# Patient Record
Sex: FEMALE | Race: WHITE | NOT HISPANIC OR LATINO | Employment: OTHER | URBAN - METROPOLITAN AREA
[De-identification: names, ages, dates, MRNs, and addresses within clinical notes are randomized per-mention and may not be internally consistent; named-entity substitution may affect disease eponyms.]

---

## 2017-09-24 ENCOUNTER — HISTORICAL (OUTPATIENT)
Dept: ADMINISTRATIVE | Facility: HOSPITAL | Age: 62
End: 2017-09-24

## 2018-03-06 ENCOUNTER — HISTORICAL (OUTPATIENT)
Dept: INTERNAL MEDICINE | Facility: CLINIC | Age: 63
End: 2018-03-06

## 2018-03-06 LAB
ABS NEUT (OLG): 2.84 X10(3)/MCL (ref 2.1–9.2)
ALBUMIN SERPL-MCNC: 3.4 GM/DL (ref 3.4–5)
ALBUMIN/GLOB SERPL: 1 RATIO (ref 1–2)
ALP SERPL-CCNC: 73 UNIT/L (ref 45–117)
ALT SERPL-CCNC: 20 UNIT/L (ref 12–78)
AST SERPL-CCNC: 15 UNIT/L (ref 15–37)
BASOPHILS # BLD AUTO: 0.04 X10(3)/MCL
BASOPHILS NFR BLD AUTO: 1 %
BILIRUB SERPL-MCNC: 0.4 MG/DL (ref 0.2–1)
BILIRUBIN DIRECT+TOT PNL SERPL-MCNC: 0.1 MG/DL
BILIRUBIN DIRECT+TOT PNL SERPL-MCNC: 0.3 MG/DL
BUN SERPL-MCNC: 14 MG/DL (ref 7–18)
CALCIUM SERPL-MCNC: 9.7 MG/DL (ref 8.5–10.1)
CHLORIDE SERPL-SCNC: 107 MMOL/L (ref 98–107)
CHOLEST SERPL-MCNC: 183 MG/DL
CHOLEST/HDLC SERPL: 3.5 {RATIO} (ref 0–4.4)
CO2 SERPL-SCNC: 29 MMOL/L (ref 21–32)
CREAT SERPL-MCNC: 0.7 MG/DL (ref 0.6–1.3)
EOSINOPHIL # BLD AUTO: 0.34 X10(3)/MCL
EOSINOPHIL NFR BLD AUTO: 6 %
ERYTHROCYTE [DISTWIDTH] IN BLOOD BY AUTOMATED COUNT: 12.5 % (ref 11.5–14.5)
EST. AVERAGE GLUCOSE BLD GHB EST-MCNC: 108 MG/DL
FERRITIN SERPL-MCNC: 246.3 NG/ML (ref 8–388)
GLOBULIN SER-MCNC: 4.2 GM/ML (ref 2.3–3.5)
GLUCOSE SERPL-MCNC: 89 MG/DL (ref 74–106)
HBA1C MFR BLD: 5.4 % (ref 4.2–6.3)
HCT VFR BLD AUTO: 38.4 % (ref 35–46)
HDLC SERPL-MCNC: 52 MG/DL
HGB BLD-MCNC: 13.1 GM/DL (ref 12–16)
IMM GRANULOCYTES # BLD AUTO: 0.02 10*3/UL
IMM GRANULOCYTES NFR BLD AUTO: 0 %
IRON SATN MFR SERPL: 36.3 % (ref 15–50)
IRON SERPL-MCNC: 95 MCG/DL (ref 50–170)
LDLC SERPL CALC-MCNC: 110 MG/DL (ref 0–130)
LYMPHOCYTES # BLD AUTO: 1.78 X10(3)/MCL
LYMPHOCYTES NFR BLD AUTO: 34 % (ref 13–40)
MAGNESIUM SERPL-MCNC: 2.1 MG/DL (ref 1.8–2.4)
MCH RBC QN AUTO: 31.8 PG (ref 26–34)
MCHC RBC AUTO-ENTMCNC: 34.1 GM/DL (ref 31–37)
MCV RBC AUTO: 93.2 FL (ref 80–100)
MONOCYTES # BLD AUTO: 0.3 X10(3)/MCL
MONOCYTES NFR BLD AUTO: 6 % (ref 4–12)
NEUTROPHILS # BLD AUTO: 2.84 X10(3)/MCL
NEUTROPHILS NFR BLD AUTO: 53 X10(3)/MCL
PLATELET # BLD AUTO: 234 X10(3)/MCL (ref 130–400)
PMV BLD AUTO: 9.5 FL (ref 7.4–10.4)
POTASSIUM SERPL-SCNC: 4.6 MMOL/L (ref 3.5–5.1)
PROT SERPL-MCNC: 7.6 GM/DL (ref 6.4–8.2)
RBC # BLD AUTO: 4.12 X10(6)/MCL (ref 4–5.2)
SODIUM SERPL-SCNC: 144 MMOL/L (ref 136–145)
TIBC SERPL-MCNC: 262 MCG/DL (ref 250–450)
TRANSFERRIN SERPL-MCNC: 217 MG/DL (ref 200–360)
TRIGL SERPL-MCNC: 107 MG/DL
VLDLC SERPL CALC-MCNC: 21 MG/DL
WBC # SPEC AUTO: 5.3 X10(3)/MCL (ref 4.5–11)

## 2018-04-02 ENCOUNTER — HISTORICAL (OUTPATIENT)
Dept: ADMINISTRATIVE | Facility: HOSPITAL | Age: 63
End: 2018-04-02

## 2018-04-11 ENCOUNTER — HISTORICAL (OUTPATIENT)
Dept: RADIOLOGY | Facility: HOSPITAL | Age: 63
End: 2018-04-11

## 2018-05-21 ENCOUNTER — HISTORICAL (OUTPATIENT)
Dept: RADIOLOGY | Facility: HOSPITAL | Age: 63
End: 2018-05-21

## 2018-06-29 ENCOUNTER — HISTORICAL (OUTPATIENT)
Dept: INTERNAL MEDICINE | Facility: CLINIC | Age: 63
End: 2018-06-29

## 2018-12-04 ENCOUNTER — HISTORICAL (OUTPATIENT)
Dept: INTERNAL MEDICINE | Facility: CLINIC | Age: 63
End: 2018-12-04

## 2019-01-08 ENCOUNTER — HISTORICAL (OUTPATIENT)
Dept: ADMINISTRATIVE | Facility: HOSPITAL | Age: 64
End: 2019-01-08

## 2019-01-23 ENCOUNTER — HISTORICAL (OUTPATIENT)
Dept: INTERNAL MEDICINE | Facility: CLINIC | Age: 64
End: 2019-01-23

## 2019-02-27 ENCOUNTER — HISTORICAL (OUTPATIENT)
Dept: ADMINISTRATIVE | Facility: HOSPITAL | Age: 64
End: 2019-02-27

## 2019-03-01 LAB — FINAL CULTURE: NORMAL

## 2019-05-01 ENCOUNTER — HISTORICAL (OUTPATIENT)
Dept: INTERNAL MEDICINE | Facility: CLINIC | Age: 64
End: 2019-05-01

## 2019-06-19 ENCOUNTER — HISTORICAL (OUTPATIENT)
Dept: RADIOLOGY | Facility: HOSPITAL | Age: 64
End: 2019-06-19

## 2019-09-05 ENCOUNTER — HISTORICAL (OUTPATIENT)
Dept: INTERNAL MEDICINE | Facility: CLINIC | Age: 64
End: 2019-09-05

## 2019-10-04 ENCOUNTER — HISTORICAL (OUTPATIENT)
Dept: INTERNAL MEDICINE | Facility: CLINIC | Age: 64
End: 2019-10-04

## 2020-10-05 ENCOUNTER — HISTORICAL (OUTPATIENT)
Dept: ADMINISTRATIVE | Facility: HOSPITAL | Age: 65
End: 2020-10-05

## 2021-01-04 ENCOUNTER — HISTORICAL (OUTPATIENT)
Dept: ADMINISTRATIVE | Facility: HOSPITAL | Age: 66
End: 2021-01-04

## 2021-07-13 ENCOUNTER — HISTORICAL (OUTPATIENT)
Dept: ADMINISTRATIVE | Facility: HOSPITAL | Age: 66
End: 2021-07-13

## 2021-07-16 LAB — FINAL CULTURE: NORMAL

## 2021-07-21 ENCOUNTER — HISTORICAL (OUTPATIENT)
Dept: RADIOLOGY | Facility: HOSPITAL | Age: 66
End: 2021-07-21

## 2021-10-26 ENCOUNTER — HISTORICAL (OUTPATIENT)
Dept: ADMINISTRATIVE | Facility: HOSPITAL | Age: 66
End: 2021-10-26

## 2021-11-08 ENCOUNTER — HISTORICAL (OUTPATIENT)
Dept: ADMINISTRATIVE | Facility: HOSPITAL | Age: 66
End: 2021-11-08

## 2021-11-09 ENCOUNTER — HISTORICAL (OUTPATIENT)
Dept: SURGERY | Facility: HOSPITAL | Age: 66
End: 2021-11-09

## 2021-12-07 LAB
HUMAN PAPILLOMAVIRUS (HPV): NORMAL
PAP RECOMMENDATION EXT: NORMAL
PAP SMEAR: NORMAL

## 2021-12-14 ENCOUNTER — HISTORICAL (OUTPATIENT)
Dept: RADIOLOGY | Facility: HOSPITAL | Age: 66
End: 2021-12-14

## 2021-12-30 ENCOUNTER — HISTORICAL (OUTPATIENT)
Dept: ADMINISTRATIVE | Facility: HOSPITAL | Age: 66
End: 2021-12-30

## 2022-04-07 ENCOUNTER — HISTORICAL (OUTPATIENT)
Dept: ADMINISTRATIVE | Facility: HOSPITAL | Age: 67
End: 2022-04-07
Payer: MEDICARE

## 2022-04-24 VITALS
HEIGHT: 64 IN | WEIGHT: 231.5 LBS | SYSTOLIC BLOOD PRESSURE: 147 MMHG | OXYGEN SATURATION: 98 % | DIASTOLIC BLOOD PRESSURE: 87 MMHG | BODY MASS INDEX: 39.52 KG/M2

## 2022-04-30 NOTE — OP NOTE
Patient:   Meseret Tello             MRN: [id number]            FIN: [id number]               Age:   66 years     Sex:  Female     :  1955   Associated Diagnoses:   None   Author:   Young Haro MD      OPERATIVE NOTE -- OPHTHALMOLOGY SERVICE     DATE: 21     SURGEON: Young Haro MD     ATTENDING: ENRICO Ramirez MD     PRE-OPERATIVE DIAGNOSIS: Visually significant cataract, RIGHT eye     POST-OPERATIVE DIAGNOSIS: Visually significant cataract, RIGHT eye     PROCEDURES: Phacoemulsification with intraocular lens, RIGHT eye     IMPLANT: [id number] +23.50 Diopters     ANESTHESIA: MAC with topical lidocaine     COMPLICATIONS: None     ESTIMATED BLOOD LOSS: < 5 cc     INDICATION:     The patient has a history of painless progressive visual loss and difficulty with activities of daily living secondary to cataract formation. After a thorough discussion of the risks, benefits and alternatives to cataract surgery, including, but not limited to, the rare risks of infection, retinal detachment, need for additional surgery, loss of vision and even loss of the eye, the patient voices good understanding and desires to proceed. Written informed consent was confirmed and witnessed prior to surgery.     PROCEDURE IN DETAIL:     The patients IOL calculations and lens selection were reviewed and confirmed. After verification and marking of the proper eye in the preop holding area, several sets of 1% tropicamide, 2.5% phenylephrine, and 1% cyclopentolate drops were then placed.     The patient was brought to the operating room in supine position where general anesthesia was achieved and the eye was prepped and draped in standard sterile fashion using 5% betadine. A lid speculum placed. A paracentesis incision was created through which viscoelastic was used to fill the anterior chamber. A 2.5mm keratome blade was used to create a triplanar temporal clear corneal incision. A cystotome and Utrata forceps was then used to  fashion a continuous curvilinear capsulorrhexis. Hydrodissection with BSS was performed using a hydrodissection cannula. Phacoemulsification of the nucleus was carried out with a divide and conquer technique. Residual cortical material was then removed with the irrigation and aspiration handpiece. The eye was reformed using viscoelastic and the above listed intraocular lens was implanted into the capsular bag. All remaining viscoelastic was removed from the eye. At the end of the case, the lens was well-centered and all wounds were found to be watertight.     Drops of Vigamox, Flurbiprofren, and Pred Forte were instilled and an eye shield placed over the eye. The patient tolerated the procedure well and was taken to the recovery area in stable condition. The patient was instructed to follow-up for routine post-operative care the following morning.     The attending surgeon was present, supervising, and assisting as necessary during the entire surgery.

## 2022-04-30 NOTE — H&P
Patient:   Meseret Tello             MRN: [id number]            FIN: [id number]               Age:   66 years     Sex:  Female     :  1955   Associated Diagnoses:   None   Author:   Young Haro MD      HPI: PT here for CE/IOL RIGHT eye. Denies changes in health or medications since the patient was last seen in clinic.     ROS: Negative x 10     SLE:  Ext: wnl OU  L/L: wnl OU  C/S: white and quiet OU  K: clear OU  AC: deep and quiet OU  I: round and reactive OU  L: cataract RIGHT eye     General NAP  HENT atraumatic  Eyes: cataract  Neck: nontender, no masses or thyromegaly  Respiratory: no distress on room air  Cardiovascular: regular rate  Gastrointestinal: no hepatomegaly  Lymphatics: no lymphadenopathy  Musculoskeletal: wnl     Assessment/Plan  1. Visually significant cataract RIGHT eye  - Pt complains of decreased vision  - Calcs obtained previously, review calcs before selecting lens  - After extensive discussion of R/B/A, informed consent was signed in clinic and reviewed today  - Plan for CE/IOL RIGHT eye today

## 2022-05-01 NOTE — HISTORICAL [facility] CERNER
This is a historical note converted from Zoraida. Formatting and pictures may have been removed.  Please reference Zoraida for original formatting and attached multimedia. Chief Complaint  FOLLOW UP APPOINTMENT  History of Present Illness  Ms. Tello is a 65 yof with PMHx significant for HTN, GERD, basal cell carcinoma (s/p excision 6/15) who presents to [facility] IM clinic for routine followup.? Patient last seen in clinic on 1/25/21 via telemedicine.  Today patient reports acute complaints including worsening bilateral foot and ankle pain over the last 2 months.? She states that she can stand for ~1 hour before experiencing significant foot and ankle pain requiring her to sit for ~20-30 minutes for relief.? 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  She also reports intermittent itching and UTI like symptoms, of note, was seen in [facility] ED in May and UA significant for UTI at that time, was prescribed course of ABX which she reports completing.  ?  Previous attempt to refer to [facility] GYN clinic for annual wellness exam, but patient declined out of concern for potential COVID exposure.? She is amicable?to clinic referral now though, therefore will place new referral.? Declining vaccines today, but agreeable to DEXA scan.  Review of Systems  Constitutional:?no fever, fatigue, weakness, no weight loss or gain  Respiratory:?no cough, no wheezing, no shortness of breath  Cardiovascular:?no chest pain, no palpitations, no edema  Gastrointestinal:?no nausea, vomiting, or diarrhea. No abdominal pain  Genitourinary:?no dysuria, reports itching intermittently  Musculoskeletal:?foot and ankle pain  Integumentary:?no skin rash or abnormal lesion  Neurologic: no headache, no dizziness, no weakness or numbness  ?  Physical Exam  Vitals & Measurements  T:?36.6? ?C (Oral)? HR:?92(Peripheral)? RR:?18? BP:?138/80?  HT:?158.00?cm? WT:?111.000?kg? BMI:?44.46?  General:?well-developed well-nourished in no acute  distress  Respiratory:?clear to auscultation bilaterally, no wheezing, no respiratory distress  Cardiovascular:?regular rate and rhythm without murmurs, gallops or rubs, no peripheral edema  Gastrointestinal:?soft, non-tender,?obese with normal bowel sounds, without masses to palpation  Musculoskeletal:?notable pes planus bilateral feet  Integumentary: no rashes or skin lesions present  Neurologic: no signs of peripheral neurological deficit, motor/sensory function intact, answers questions appropriately  ?  Assessment/Plan  Ankle Joint Pain  Pes Planus  -lengthy discussion regarding arch support, including inserts and/or orthotics, patient would like to try these prior to referral to podiatry, etc  -XR right, left ankles today  ?   UTI  Urinary complaints  -noted UTI at ED presentation in May, reports completing ABX  -reports intermittent itching, possible dsyuria, will repeat UA today  ?   GERD  -esomeprazole? (patient buys OTC), patient reports taking PRN  -lengthy discussion today regarding PRN use vs daily use, has had previous EGD.? If symptoms worsen or change, discussed possible repeat EGD in future  -notices food triggers, trying to avoid those triggers, including fried foods and sweets, red/tomato sauce  ?   Hypertension  -patient not on antihypertensive medication at this time  -B/P today?138/80  ?   Restless Leg Syndrome  -Patient reports infrequent?episodes of restless leg  -Patient has prescription for ropinirole 0.25 mg, states she still has prescriptions left as she does not have to take this very often  ?   History of right temporal basal cell carcinoma  -(status post excision 6/15)  -Derm referral at previous visit  ?   Allergic rhinitis  -patient takes Claritin daily  ?   Health Maintenance  -OB/GYN referral placed at previous visit for cervical cancer screening, GYN called but patient declined visit out of concern for COVID-19/new [place] strain.? Will place another referral today for annual  screening  -DEXA scan ordered today  -Flu shot at?previous visit  -Refusing vaccines today  -reviewed recent labs from ED visit in 5/21, WNL  ?  ?  ?  UA, XR ankles bilateral today  Referral to GYN  DEXA scan ordered  Return to clinic in 6 months   Problem List/Past Medical History  Ongoing  Allergic rhinitis  Breast cancer screening  Combined form of age-related cataract, both eyes  GERD (gastroesophageal reflux disease)  H/O gastroesophageal reflux (GERD)  Hypertension  Hypertension  Leg cramp  Obesity  Skin lesion  Historical  No qualifying data  Procedure/Surgical History  excision of BCC right temple (06/24/2015)  Angiocardiography of left heart structures (12/29/2014)  Catheter placement in coronary artery(s) for coronary angiography, including intraprocedural injection(s) for coronary angiography, imaging supervision and interpretation; with left heart catheterization including intraprocedural i. (12/29/2014)  Left heart cardiac catheterization (12/29/2014)  Left Heart Cath w/COR Angio Ventriculography (None) (12/29/2014)  Appendectomy  Biopsy of breast  Breast lumpectomy  cyst removed from right breast  Excision of lesion of artery of upper limb  skin cancer removed from left arm   Medications  albuterol 2.5 mg/3 mL (0.083%) inhalation solution, 2.5 mg= 3 mL, NEB, q6hr, PRN, 2 refills  aspirin 81 mg oral tablet, 81 mg= 1 tab(s), Oral, Daily, 3 refills  biotin 1000 mcg oral tablet, 1000 mcg= 1 tab(s), Oral, Daily, 2 refills  Claritin 10 mg oral tablet, 10 mg= 1 tab(s), Oral, Daily, 2 refills  docusate sodium 100 mg oral tablet, 1, Oral, Daily, 3 refills  nitroglycerin 0.4 mg sublingual TAB, 0.4 mg= 1 tab(s), SL, q5min, PRN, 2 refills  ropinirole 0.25 mg oral tablet, 0.25 mg= 1 tab(s), Oral, Daily, PRN, 3 refills,? ?Still taking, not as prescribed: prn  Triple Antibiotic Ointment topical, 1 neida, TOP, Once  Allergies  No Known Medication Allergies  Social History  Abuse/Neglect  No, No, Yes, 07/13/2021  Alcohol  - Low Risk, 09/10/2014  Current, 1-2 times per year, 01/04/2021  Employment/School - Not employed or in school, 09/10/2014  Unemployed, 09/10/2019  Exercise  Exercise frequency: 3-4 times/week., 09/10/2019  Home/Environment - No Risk, 09/10/2014  Lives with Spouse. Living situation: Home/Independent., 09/10/2019  Nutrition/Health  Regular, 09/10/2019  Sexual  Gender Identity Identifies as female., 03/04/2021  Substance Use - Denies Substance Abuse, 09/10/2014  Never, 09/10/2019  Tobacco - Denies Tobacco Use, 09/10/2014  Former smoker, quit more than 30 days ago, N/A, 07/13/2021  Family History  Cancer: Father.  Cardiac arrhythmia.: Mother.  Hyperlipidemia.: Brother.  Hypertension.: Brother.  Immunizations  Vaccine Date Status   influenza virus vaccine, inactivated 10/05/2020 Given   influenza virus vaccine, inactivated 01/02/2020 Given   influenza virus vaccine, inactivated 09/19/2018 Given   tetanus/diphth/pertuss (Tdap) adult/adol 06/30/2016 Recorded   Health Maintenance  Health Maintenance  ???Pending?(in the next year)  ??? ??Due?  ??? ? ? ?Breast Cancer Screening due??06/18/21??and every 2??year(s)  ??? ? ? ?Bone Density Screening due??07/13/21??Variable frequency  ??? ? ? ?IPPE due??07/13/21??One-time only  ??? ? ? ?Medicare Annual Wellness Exam due??07/13/21??and every 1??year(s)  ??? ? ? ?Pneumococcal Vaccine due??07/13/21??Unknown Frequency  ??? ? ? ?Zoster Vaccine due??07/13/21??Unknown Frequency  ??? ??Due In Future?  ??? ? ? ?Influenza Vaccine not due until??10/01/21??and every 1??day(s)  ??? ? ? ?Aspirin Therapy for CVD Prevention not due until??10/05/21??and every 1??year(s)  ??? ? ? ?Colorectal Screening not due until??12/04/21??and every 1??year(s)  ??? ? ? ?Obesity Screening not due until??01/01/22??and every 1??year(s)  ??? ? ? ?Advance Directive not due until??01/02/22??and every 1??year(s)  ??? ? ? ?Alcohol Misuse Screening not due until??01/02/22??and every 1??year(s)  ??? ? ? ?Cognitive  Screening not due until??01/02/22??and every 1??year(s)  ??? ? ? ?Fall Risk Assessment not due until??01/02/22??and every 1??year(s)  ??? ? ? ?Functional Assessment not due until??01/02/22??and every 1??year(s)  ??? ? ? ?ADL Screening not due until??01/25/22??and every 1??year(s)  ??? ? ? ?Hypertension Management-BMP not due until??05/22/22??and every 1??year(s)  ???Satisfied?(in the past 1 year)  ??? ??Satisfied?  ??? ? ? ?ADL Screening on??01/25/21.??Satisfied by Gabriella Lott LPN.  ??? ? ? ?Advance Directive on??07/13/21.??Satisfied by Gabriella Lott LPN.  ??? ? ? ?Alcohol Misuse Screening on??01/04/21.??Satisfied by Selin Webster RN  ??? ? ? ?Aspirin Therapy for CVD Prevention on??10/05/20.??Satisfied by Edmond Wild DO??Reason: Expectation Satisfied Elsewhere  ??? ? ? ?Blood Pressure Screening on??07/13/21.??Satisfied by Gabriella Lott LPN.  ??? ? ? ?Body Mass Index Check on??07/13/21.??Satisfied by Gabriella Lott LPN.  ??? ? ? ?Cognitive Screening on??07/13/21.??Satisfied by Gabriella Lott LPN.  ??? ? ? ?Colorectal Screening on??12/03/20.??Satisfied by Neris Glynn  ??? ? ? ?Depression Screening on??07/13/21.??Satisfied by Gabriella Lott LPN.  ??? ? ? ?Diabetes Maintenance-Eye Exam on??03/04/21.??Satisfied by Helen Sharma  ??? ? ? ?Diabetes Screening on??05/21/21.??Satisfied by Talita Schmidt  ??? ? ? ?Fall Risk Assessment on??07/13/21.??Satisfied by Gabriella Lott LPN  ??? ? ? ?Functional Assessment on??07/13/21.??Satisfied by Gabriella Lott LPN  ??? ? ? ?Hypertension Management-Blood Pressure on??07/13/21.??Satisfied by Gabriella Lott LPN.  ??? ? ? ?Influenza Vaccine on??10/05/20.??Satisfied by Sissy Pan LPN  ??? ? ? ?Lipid Screening on??10/05/20.??Satisfied by Talita Schmidt  ??? ? ? ?Obesity Screening on??07/13/21.??Satisfied by Gabriella Lott LPN  ?      Reviewed present illness, physical exam, assessment/plan of resident. Case discussed with the  resident. Care provided is reasonable and necessary.

## 2022-05-01 NOTE — HISTORICAL [facility] CERNER
This is a historical note converted from Zoraida. Formatting and pictures may have been removed.  Please reference Zoraida for original formatting and attached multimedia. Chief Complaint  right flamk pain x 2 days  History of Present Illness  63 year old WF with complaints of right flank pain x 2 days. No dysuria or hematuria reported. No N/V. No fever. Chronic urinary frequency; no incontinence. No vaginal irritation or discharge.  She has taken tylenol with relief of symptoms.  Hx of Gastritis, CAD, high cholesterol.  PCP Dr. Powers.  Review of Systems  GENERAL: Negative except as stated?in HPI  CV: Negative except as stated in HPI  RESP: Negative except as stated?in HPI  GI: Negative?except as stated in?HPI  : Negative?except as stated in?HPI  SKIN: Negative?except as stated in?HPI  Neuro: Negative?except as stated in?HPI  MS: Negative?except as stated in?HPI  Psych: Negative?except as stated in?HPI  Physical Exam  Vitals & Measurements  T:?36.8? ?C (Oral)? HR:?70(Peripheral)? RR:?18? BP:?114/77? SpO2:?96%?  HT:?158?cm? WT:?106.4?kg? BMI:?42.41?  Vitals reviewed  GENERAL: Awake and alert; no acute distress.  CV: Normal rate and rhythm. No murmur or JVD. No edema. Normal peripheral perfusion or pulses.  RESP: Respirations even and nonlabored. BBS clear to auscultation.  GI: Abdomen obese,?soft and non distended. Normoactive bowel sounds x 4 quadrants. No significant tenderness with palpation; no rebound or guarding.?No CVA tenderness.  NEURO: Awake, alert and oriented. No focal or sensory deficits.  INTEGUMENTARY: Skin warm, dry, and intact. No rashes or?unusual bruising.  PSYCH: Appropriate mood and affect; cooperative.  ?  Assessment/Plan  1.?Acute UTI?N39.0  Urine Dipstick with moderate leukocytes, trace blood; urine C&S pending. Rx for Macrobid and Pyridium. Increase oral fluids. Always wipe front to back. Follow up for?persistent urinary symptoms. TO ER for fever, chills, N/V, tisha hematuria or worsening  in condition.  Ordered:  After Hrs Visit [id number] PC, Acute UTI, 02/27/19 17:36:00 CST  Office/Outpatient Visit Level 3 Established [id number] PC, Acute UTI, 02/27/19 17:36:00 CST  Urine Culture [id number], Stat collect, 02/27/19 17:38:00 CST, Order for future visit, Urine, Nurse collect, Stop date 02/27/19 17:38:00 CST, Acute UTI  ?  Lower back pain?M54.5  ?  Orders:  nitrofurantoin, 100 mg = 1 cap(s), Oral, BID, with food, X 7 day(s), # 14 cap(s), 0 Refill(s), Pharm[facility]: [id number]  phenazopyridine, 100 mg = 1 tab(s), Oral, TID, X 3 day(s), # 9 tab(s), 0 Refill(s), Pharmacy: [facility]   Problem List/Past Medical History  Ongoing  Allergic rhinitis  Breast cancer screening  GERD (gastroesophageal reflux disease)  H/O gastroesophageal reflux (GERD)  Hypertension  Hypertension  Leg cramp  Obesity  Skin lesion  Historical  No qualifying data  Procedure/Surgical History  excision of BCC right temple (06/24/2015)  Left Heart Cath w/COR Angio Ventriculography (None) (12/29/2014)  Appendectomy  Appendectomy  Biopsy of breast  Breast lumpectomy  cyst removed from right breast  Excision of lesion of artery of upper limb  skin cancer removed from left arm   Medications  albuterol 2.5 mg/3 mL (0.083%) inhalation solution, 2.5 mg= 3 mL, NEB, q6hr, PRN, 2 refills  aspirin 81 mg oral tablet, 81 mg= 1 tab(s), Oral, Daily, 3 refills  Benicar 20 mg oral tablet, 20 mg= 1 tab(s), Oral, Daily, 9 refills  biotin 1000 mcg oral tablet, 1000 mcg= 1 tab(s), Oral, Daily, 2 refills  Carafate 1 g oral tablet, 1 gm= 1 tab(s), Oral, QID  Claritin 10 mg oral tablet, 10 mg= 1 tab(s), Oral, Daily, 2 refills  docusate sodium 100 mg oral tablet, 1, Oral, Daily, 3 refills  Macrobid 100 mg oral capsule, 100 mg= 1 cap(s), Oral, BID  nitroglycerin 0.4 mg sublingual TAB, 0.4 mg= 1 tab(s), SL, q5min, PRN, 1 refills  Pyridium 100 mg oral tablet, 100 mg= 1 tab(s), Oral, TID  Triple Antibiotic Ointment topical, 1 neida, TOP, Once  Allergies  No Known  Medication Allergies  Social History  Alcohol - Low Risk, 09/10/2014  Current, Beer, 1-2 times per year, Alcohol use interferes with work or home: No. Drinks more than intended: No. Others hurt by drinking: No. Ready to change: No. Household alcohol concerns: No., 02/27/2019  Employment/School - Not employed or in school, 09/10/2014  Unemployed, 07/21/2016  Exercise  Exercise frequency: 3-4 times/week. Self assessment: Fair condition. Exercise type: [employer] exercise for 1 hour., 01/08/2019  Home/Environment - No Risk, 09/10/2014  Lives with Spouse. Living situation: Home/Independent. Alcohol abuse in household: No. Substance abuse in household: No. Smoker in household: No. Injuries/Abuse/Neglect in household: No. Feels unsafe at home: No., 09/10/2014  Nutrition/Health  Regular, Sleeping concerns: No. Feels highly stressed: No., 03/26/2015  Substance Abuse - Denies Substance Abuse, 09/10/2014  Never, 02/27/2019  Tobacco - Denies Tobacco Use, 09/10/2014  Former smoker, quit more than 30 days ago, N/A, Ready to change: No. Previous treatment: None. Household tobacco concerns: No. Smokeless Tobacco Use: Never., 02/27/2019  Former smoker, quit more than 30 days ago, No, 02/18/2019  Family History  Cancer: Father.  Cardiac arrhythmia.: Mother.  Hyperlipidemia.: Brother.  Hypertension.: Brother.  Immunizations  Vaccine Date Status   influenza virus vaccine, inactivated 09/19/2018 Given   tetanus/diphth/pertuss (Tdap) adult/adol 06/30/2016 Recorded   Health Maintenance  Health Maintenance  ???Pending?(in the next year)  ??? ??Due?  ??? ? ? ?Alcohol Misuse Screening due??02/27/19??and every 1??year(s)  ??? ? ? ?Cervical Cancer Screening due??02/27/19??and every?  ??? ? ? ?Zoster Vaccine due??02/27/19??and every 100??year(s)  ??? ??Due In Future?  ??? ? ? ?Colorectal Screening not due until??12/04/19??and every 1??year(s)  ??? ? ? ?ADL Screening not due until??01/08/20??and every 1??year(s)  ??? ? ? ?Aspirin Therapy  for CVD Prevention not due until??01/08/20??and every 1??year(s)  ??? ? ? ?Hypertension Management-BMP not due until??01/23/20??and every 1??year(s)  ???Satisfied?(in the past 1 year)  ??? ??Satisfied?  ??? ? ? ?ADL Screening on??01/08/19.??Satisfied by Domo Obando RN  ??? ? ? ?Aspirin Therapy for CVD Prevention on??01/08/19.??Satisfied by Asa Powers DO  ??? ? ? ?Blood Pressure Screening on??02/27/19.??Satisfied by Lisa Anderson LPN  ??? ? ? ?Body Mass Index Check on??02/27/19.??Satisfied by Lisa Anderson LPN  ??? ? ? ?Breast Cancer Screening on??04/11/18.??Satisfied by Chelsy Samayoa  ??? ? ? ?Colorectal Screening on??12/03/18.??Satisfied by Mary Krishnan  ??? ? ? ?Depression Screening on??02/27/19.??Satisfied by Lisa Anderson LPN  ??? ? ? ?Diabetes Maintenance-Eye Exam on??11/12/18.??Satisfied by Shani Woody  ??? ? ? ?Diabetes Screening on??01/23/19.??Satisfied by Stephanie Steward.  ??? ? ? ?Hypertension Management-Blood Pressure on??02/27/19.??Satisfied by Lisa Anderson LPN  ??? ? ? ?Influenza Vaccine on??02/18/19.??Satisfied by Mallika Anderson MA  ??? ? ? ?Lipid Screening on??01/23/19.??Satisfied by Stephanie Steward P.  ??? ? ? ?Obesity Screening on??02/27/19.??Satisfied by Lisa Anderson LPN  ?  ?  Lab Results  Test Name Test Result Date/Time   Urine Color Urine Dipstick Yellow 02/27/2019 17:18 CST   Urine Appearance Urine Dipstick Clear 02/27/2019 17:18 CST   pH Urine Dipstick 6 02/27/2019 17:18 CST   Specific Gravity Urine Dipstick 1.005 02/27/2019 17:18 CST   Blood Urine Dipstick Trace 02/27/2019 17:18 CST   Glucose Urine Dipstick Negative 02/27/2019 17:18 CST   Ketones Urine Dipstick Negative 02/27/2019 17:18 CST   Protein Urine Dipstick Negative 02/27/2019 17:18 CST   Bilirubin Urine Dipstick Negative 02/27/2019 17:18 CST   Urobilinogen Urine Dipstick 1 mg/dl 02/27/2019 17:18 CST   Leukocytes Urine Dipstick Negative 02/27/2019 17:18 CST   Nitrite Urine Dipstick Negative  02/27/2019 17:18 CST

## 2022-05-24 ENCOUNTER — OFFICE VISIT (OUTPATIENT)
Dept: FAMILY MEDICINE | Facility: CLINIC | Age: 67
End: 2022-05-24
Payer: MEDICARE

## 2022-05-24 VITALS
HEIGHT: 64 IN | RESPIRATION RATE: 18 BRPM | OXYGEN SATURATION: 97 % | SYSTOLIC BLOOD PRESSURE: 112 MMHG | HEART RATE: 73 BPM | WEIGHT: 231 LBS | TEMPERATURE: 98 F | DIASTOLIC BLOOD PRESSURE: 73 MMHG | BODY MASS INDEX: 39.44 KG/M2

## 2022-05-24 DIAGNOSIS — R22.30 NODULE OF SKIN OF HAND: Primary | ICD-10-CM

## 2022-05-24 PROCEDURE — 88305 TISSUE EXAM BY PATHOLOGIST: CPT | Performed by: STUDENT IN AN ORGANIZED HEALTH CARE EDUCATION/TRAINING PROGRAM

## 2022-05-24 PROCEDURE — 11104 PUNCH BX SKIN SINGLE LESION: CPT | Mod: PBBFAC | Performed by: STUDENT IN AN ORGANIZED HEALTH CARE EDUCATION/TRAINING PROGRAM

## 2022-05-24 PROCEDURE — 99214 OFFICE O/P EST MOD 30 MIN: CPT | Mod: PBBFAC,25

## 2022-05-24 RX ORDER — LIDOCAINE HCL/EPINEPHRINE/PF 2%-1:200K
5 VIAL (ML) INJECTION ONCE
Status: COMPLETED | OUTPATIENT
Start: 2022-05-24 | End: 2022-05-24

## 2022-05-24 RX ADMIN — Medication 5 ML: at 12:05

## 2022-05-24 NOTE — PROGRESS NOTES
Chief Complaint  Skin lesion on right wrist (Tender to touch)      History of Present Illness  Meseret Tello is a 67 y.o. year old female presents for skin lesion on the R wrist for 2 months. Pt states the area was previously frozen, but the area did not get any smaller. No other concerns    Review of Systems  Review of Systems   Constitutional: Negative for fever.   Skin:        See HPI       Physical Exam  Physical Exam  Skin:               Vitals:    05/24/22 1017   BP: 112/73   Pulse: 73   Resp: 18   Temp: 98 °F (36.7 °C)     Wt Readings from Last 2 Encounters:   05/24/22 104.8 kg (231 lb)   01/25/22 105 kg (231 lb 7.7 oz)     Excision of Lesion    Date/Time: 5/24/2022 9:45 AM  Performed by: Caity Nichole MD  Authorized by: Lory Hamm MD     Consent Done?:  Yes (Written)  Timeout: prior to procedure the correct patient, procedure, and site was verified    Prep: patient was prepped and draped in usual sterile fashion    Local anesthesia used?: Yes    Anesthesia:  Local infiltration   I was present for the entire procedure.  : possible skin cancer.  Body area:  Hand  Laterality:  Right   Patient was prepped and draped in the normal sterile fashion.  Anesthesia:  Local infiltration  Excision type:  Skin  Excision size (cm):  0.5  Incision type: circular blade.   Hemostasis was obtained.  Wound closure:  Simple  Sutures: 2 simple uninterrupted.   Needle, instrument, and sponge counts were correct.   Patient tolerated the procedure well with no immediate complications.   Post-operative instructions were provided for the patient.   Patient was discharged and will follow up for wound check and pathology results. and Patient was discharged and will follow up for wound check.              Assessment / Plan:  Nodule of skin of hand  Comments:  Punch biopsy performed  Will call pt with results  Orders:  -     Specimen to Pathology Dermatology and skin neoplasms    Other orders  -     LIDOcaine-EPINEPHrine (PF)  2%-1:200,000 injection 5 mL  -     Cancel: Excision of Lesion  -     Destruction, Premalignant Lesion  -     Excision of Lesion           Follow up:    In 2 weeks for suture removal    Caity Nichole MD

## 2022-05-26 ENCOUNTER — TELEPHONE (OUTPATIENT)
Dept: FAMILY MEDICINE | Facility: CLINIC | Age: 67
End: 2022-05-26
Payer: MEDICARE

## 2022-05-26 LAB
ESTROGEN SERPL-MCNC: NORMAL PG/ML
INSULIN SERPL-ACNC: NORMAL U[IU]/ML
LAB AP CLINICAL INFORMATION: NORMAL
LAB AP GROSS DESCRIPTION: NORMAL
LAB AP REPORT FOOTNOTES: NORMAL
T3RU NFR SERPL: NORMAL %

## 2022-05-26 NOTE — TELEPHONE ENCOUNTER
Attempted to contact patient to discuss skin biopsy pathology results, but it went to voicemail. Will attempt again at a later time.

## 2022-06-08 ENCOUNTER — OFFICE VISIT (OUTPATIENT)
Dept: FAMILY MEDICINE | Facility: CLINIC | Age: 67
End: 2022-06-08
Payer: MEDICARE

## 2022-06-08 VITALS
BODY MASS INDEX: 39.45 KG/M2 | SYSTOLIC BLOOD PRESSURE: 140 MMHG | HEIGHT: 64 IN | HEART RATE: 70 BPM | WEIGHT: 231.06 LBS | RESPIRATION RATE: 18 BRPM | OXYGEN SATURATION: 100 % | DIASTOLIC BLOOD PRESSURE: 95 MMHG | TEMPERATURE: 99 F

## 2022-06-08 DIAGNOSIS — L57.0 ACTINIC KERATOSIS: Primary | ICD-10-CM

## 2022-06-08 PROCEDURE — 99214 OFFICE O/P EST MOD 30 MIN: CPT | Mod: PBBFAC | Performed by: FAMILY MEDICINE

## 2022-06-08 NOTE — PROGRESS NOTES
Subjective:       Patient ID: Meseret Tello is a 67 y.o. female.    Chief Complaint: No chief complaint on file.    HPI     Pt here for suture removal of recently biopsied lesion on her right had. States that the sutures came out on their own. She has not had any problems with the lesion after the biopsy. No other concerns at this time.     Final Diagnosis   Skin, right hand lesion, shave biopsy:    - Actinic keratosis with area suspicious for a more severe lesion.  Recommend additional tissue for further evaluation.         Review of Systems  As per HPI       Objective:      Vitals:    06/08/22 0844   BP: (!) 140/95   Pulse: 70   Resp: 18   Temp: 99 °F (37.2 °C)       Physical Exam    Gen: alert, no acute distress  Skin: right hand- biopsy site well healed. Sutures no longer in place.   Psych: cooperative, appropriate mood and affect      Assessment/Plan:  Actinic keratosis  -     Ambulatory referral/consult to Plastic Surgery; Future; Expected date: 06/15/2022    - since biopsy showed AK with area suspicious for more severe lesion, I suspect that there could be a component of SCC in the lesion. Will refer patient to plastic surgery for excision of the lesion. Pt voiced understanding of this plan.      Follow up if symptoms worsen or fail to improve.

## 2022-06-18 ENCOUNTER — OFFICE VISIT (OUTPATIENT)
Dept: INTERNAL MEDICINE | Facility: CLINIC | Age: 67
End: 2022-06-18
Payer: MEDICARE

## 2022-06-18 VITALS
RESPIRATION RATE: 16 BRPM | HEIGHT: 63 IN | TEMPERATURE: 97 F | WEIGHT: 233 LBS | SYSTOLIC BLOOD PRESSURE: 140 MMHG | DIASTOLIC BLOOD PRESSURE: 81 MMHG | BODY MASS INDEX: 41.29 KG/M2

## 2022-06-18 DIAGNOSIS — Z00.00 WELLNESS EXAMINATION: Primary | ICD-10-CM

## 2022-06-18 PROCEDURE — 99213 OFFICE O/P EST LOW 20 MIN: CPT | Mod: PBBFAC | Performed by: STUDENT IN AN ORGANIZED HEALTH CARE EDUCATION/TRAINING PROGRAM

## 2022-06-18 RX ORDER — CETIRIZINE HYDROCHLORIDE 10 MG/1
10 TABLET ORAL
COMMUNITY
Start: 2021-08-23 | End: 2022-09-06 | Stop reason: SDUPTHER

## 2022-06-18 RX ORDER — OMEPRAZOLE 20 MG/1
20 CAPSULE, DELAYED RELEASE ORAL
COMMUNITY
Start: 2021-08-23 | End: 2022-09-06 | Stop reason: SDUPTHER

## 2022-06-18 RX ORDER — ALBUTEROL SULFATE 0.83 MG/ML
2.5 SOLUTION RESPIRATORY (INHALATION)
COMMUNITY
Start: 2022-01-25

## 2022-06-18 RX ORDER — TIZANIDINE 4 MG/1
4 TABLET ORAL
COMMUNITY
Start: 2021-12-30

## 2022-06-18 NOTE — PROGRESS NOTES
"INTERNAL MEDICINE RESIDENT CLINIC  CLINIC NOTE    Patient Name: Meseret Tello  YOB: 1955  Chief Complaint: Medicare AWV     PRESENTING HISTORY   History of Present Illness:  Ms. Meseret Tello is a 67 y.o. female w/ PMH of Hypertension, GERD, basal cell carcinoma s/p excision in 6/2015, and obesity.  She presents to Saturday wellness clinic for routine follow up.  She has no complaints at this time, denies any acute complaints.      Review of Systems:  12 point review of symptoms negative unless otherwise stated above    PAST HISTORY:     History reviewed. No pertinent past medical history.     History reviewed. No pertinent surgical history.    Family History   Problem Relation Age of Onset    Heart failure Mother     Cancer Father     No Known Problems Sister     No Known Problems Brother        Social History     Socioeconomic History    Marital status:      Spouse name: MIGUEL    Number of children: 2    Years of education: 12   Tobacco Use    Smoking status: Never Smoker    Smokeless tobacco: Never Used       MEDICATIONS:     Current Outpatient Medications   Medication Instructions    albuterol (PROVENTIL) 2.5 mg, Inhalation    cetirizine (ZYRTEC) 10 mg, Oral    omeprazole (PRILOSEC) 20 mg, Oral    tiZANidine (ZANAFLEX) 4 mg, Oral        OBJECTIVE:   Vital Signs:  Vitals:    06/18/22 1130   BP: (!) 140/81   Resp: 16   Temp: 97 °F (36.1 °C)   TempSrc: Oral   Weight: 105.7 kg (233 lb 0.4 oz)   Height: 5' 3" (1.6 m)        Physical Exam:  General: well-developed well-nourished in no acute distress  Eye: PERRLA, EOMI, clear conjunctiva, eyelids normal  HENT: Head-normocephalic and atraumatic  Neck: full range of motion, no thyromegaly or lymphadenopathy, trachea midline, supple, no palpable thyroid nodules  Respiratory: clear to auscultation bilaterally without wheezes, rales, rhonchi  Cardiovascular: regular rate and rhythm without murmurs.  No gallops or rubs no JVD.  Capillary " refill within normal limits.  Gastrointestinal: soft, non-tender, non-distended with normal bowel sounds, without masses to palpation  Genitourinary: no CVA tenderness to palpation  Musculoskeletal: full range of motion of all extremities/spine without limitation or discomfort  Integumentary: no rashes or skin lesions present  Neurologic: no signs of peripheral neurological deficit, motor/sensory function intact  Psychiatric:  alert and oriented, cognitive function intact, cooperative with exam, good eye contact, judgement and insight intact, mood and affect full range.    Laboratory  Lab Results   Component Value Date     03/06/2018    K 4.6 03/06/2018    CO2 29 03/06/2018    BUN 14 03/06/2018    CREATININE 0.70 03/06/2018    CALCIUM 9.7 03/06/2018    BILIDIR 0.1 03/06/2018    IBILI 0.3 03/06/2018    ALKPHOS 73 03/06/2018    AST 15 03/06/2018    ALT 20 03/06/2018    MG 2.1 03/06/2018        Lab Results   Component Value Date    WBC 5.3 03/06/2018    RBC 4.12 03/06/2018    HGB 13.1 03/06/2018    HCT 38.4 03/06/2018    MCV 93.2 03/06/2018    MCH 31.8 03/06/2018    MCHC 34.1 03/06/2018    RDW 12.5 03/06/2018     03/06/2018    MPV 9.5 03/06/2018        Diagnostic Results:  No results found in the last 30 days.   No results found in the last 24 hours.     ASSESSMENT & PLAN:     Annual Wellness:     List of current medical providers:    PCP: Edmond Wild DO  Consultants:       Medical History/Current Medical Problems:  1) Hypertension  2) GERD  3) basal cell carcinoma s/p excision in 6/2015  4) Obesity     Current Risk Factors and Review of Functional Ability:  *Depression Screening per nursing  *Domestic Violence/Unsafe Relationship Screening per nursing triage  *MMSE per nursing triage  *Fall Risk Screening per nursing triage  *In home risk factors per nursing triage  *ADLs per nursing triage     General Screening:  *HTN - 140/81  *Obesity screening - BMI of 41.28  *HIV/HEP screening - HIV/Hep  negative on 9/5/2019  *DM 2 (>/80) - N/A  *HLD- Due now  *AAA (current 66 male smoking history) - N/A  *Osteoporosis -  7/2021, osteopenia of lumbar spine  *Alcohol use - denies  *Tobacco use - denies     Cancer Screening:  *Breast cancer -12/14/21, R - BIRADS 2 L - BIRADS 1  *Cervical cancer - 12/14/21 - wnl  *Colon cancer - Due now     Vaccines:  *Flu - Up to date, 1/25/22  *Tetanus/Tdap - Up to date, done on 6/30/2016  *Pneumonia - Refused today  *Zoster - Refused today     Written Screening Schedule to be Given at D/C:  *Colonoscopy - ordered cologuard  *Mammogram - repeat in  years  *Vaccinations - refused zoster and pneumovax  *Bone Density - due on 7/21/2024, last done on 7/21/21    Meño Palacios MD  06/18/2022, 11:48 AM      Patient seen by Dr. Palacios on 6/18/22

## 2022-08-30 ENCOUNTER — OFFICE VISIT (OUTPATIENT)
Dept: URGENT CARE | Facility: CLINIC | Age: 67
End: 2022-08-30
Payer: MEDICARE

## 2022-08-30 VITALS
OXYGEN SATURATION: 99 % | HEIGHT: 63 IN | WEIGHT: 233.44 LBS | HEART RATE: 94 BPM | TEMPERATURE: 98 F | SYSTOLIC BLOOD PRESSURE: 160 MMHG | RESPIRATION RATE: 20 BRPM | BODY MASS INDEX: 41.36 KG/M2 | DIASTOLIC BLOOD PRESSURE: 110 MMHG

## 2022-08-30 DIAGNOSIS — J30.9 ALLERGIC RHINITIS, UNSPECIFIED SEASONALITY, UNSPECIFIED TRIGGER: Primary | ICD-10-CM

## 2022-08-30 PROCEDURE — 99214 PR OFFICE/OUTPT VISIT, EST, LEVL IV, 30-39 MIN: ICD-10-PCS | Mod: S$PBB,,, | Performed by: FAMILY MEDICINE

## 2022-08-30 PROCEDURE — 99214 OFFICE O/P EST MOD 30 MIN: CPT | Mod: S$PBB,,, | Performed by: FAMILY MEDICINE

## 2022-08-30 PROCEDURE — 99213 OFFICE O/P EST LOW 20 MIN: CPT | Mod: PBBFAC | Performed by: FAMILY MEDICINE

## 2022-08-30 RX ORDER — AMOXICILLIN AND CLAVULANATE POTASSIUM 875; 125 MG/1; MG/1
1 TABLET, FILM COATED ORAL 2 TIMES DAILY
Qty: 20 TABLET | Refills: 0 | Status: SHIPPED | OUTPATIENT
Start: 2022-08-30 | End: 2022-09-06 | Stop reason: ALTCHOICE

## 2022-08-30 RX ORDER — PREDNISONE 20 MG/1
20 TABLET ORAL DAILY
Qty: 5 TABLET | Refills: 0 | Status: SHIPPED | OUTPATIENT
Start: 2022-08-30 | End: 2022-09-04

## 2022-08-30 NOTE — PROGRESS NOTES
"Subjective:       Patient ID: Meseret Tello is a 67 y.o. female.    Chief Complaint: Sinus Problem (X sat   ) and Cough (Mild  cough   REFUSES SWABS)      HPI  68yo female with sinus congestion, cough, and itchy throat.  Does not want swabs today.  Has tried OTCs with no improvement.  Denies fever, SOB, CP, or other symptoms.   Review of Systems   Constitutional:         As above   HENT:          As above   Respiratory:          As above       Objective:       Vital Signs  Temp: 98.2 °F (36.8 °C)  Pulse: 94  Resp: 20  SpO2: 99 %  BP: (!) 160/110  BP Location: Left arm  Patient Position: Sitting  Pain Score: 0-No pain  Height and Weight  Height: 5' 3.39" (161 cm)  Weight: 105.9 kg (233 lb 7.5 oz)  BSA (Calculated - sq m): 2.18 sq meters  BMI (Calculated): 40.9  Weight in (lb) to have BMI = 25: 142.6]  Physical Exam  Constitutional:       Appearance: Normal appearance.   HENT:      Head: Normocephalic and atraumatic.      Right Ear: There is impacted cerumen.      Left Ear: Tympanic membrane and ear canal normal. There is no impacted cerumen.      Nose: Congestion present. No rhinorrhea.      Mouth/Throat:      Mouth: Mucous membranes are moist.      Pharynx: Posterior oropharyngeal erythema present. No oropharyngeal exudate.   Eyes:      Extraocular Movements: Extraocular movements intact.      Conjunctiva/sclera: Conjunctivae normal.   Cardiovascular:      Rate and Rhythm: Normal rate and regular rhythm.      Heart sounds: Normal heart sounds.   Pulmonary:      Breath sounds: Normal breath sounds.   Musculoskeletal:      Cervical back: No tenderness.   Lymphadenopathy:      Cervical: No cervical adenopathy.   Skin:     General: Skin is warm and dry.   Neurological:      General: No focal deficit present.      Mental Status: She is alert.   Psychiatric:         Mood and Affect: Mood and affect normal.         Speech: Speech normal.         Behavior: Behavior normal. Behavior is cooperative.         Thought " Content: Thought content does not include homicidal or suicidal ideation.       Assessment:       Problem List Items Addressed This Visit    None  Visit Diagnoses       Allergic rhinitis, unspecified seasonality, unspecified trigger    -  Primary    Relevant Medications    predniSONE (DELTASONE) 20 MG tablet    amoxicillin-clavulanate 875-125mg (AUGMENTIN) 875-125 mg per tablet              Plan:           Take Augmentin if a fever occurs  Encouraged ibuprofen/acetaminophen as needed  ER precautions  FU with PCP

## 2022-09-06 ENCOUNTER — OFFICE VISIT (OUTPATIENT)
Dept: INTERNAL MEDICINE | Facility: CLINIC | Age: 67
End: 2022-09-06
Payer: MEDICARE

## 2022-09-06 VITALS
RESPIRATION RATE: 20 BRPM | DIASTOLIC BLOOD PRESSURE: 84 MMHG | OXYGEN SATURATION: 98 % | BODY MASS INDEX: 41.39 KG/M2 | HEART RATE: 68 BPM | WEIGHT: 233.63 LBS | SYSTOLIC BLOOD PRESSURE: 123 MMHG | TEMPERATURE: 98 F | HEIGHT: 63 IN

## 2022-09-06 DIAGNOSIS — L29.9 PRURITUS: ICD-10-CM

## 2022-09-06 DIAGNOSIS — Z12.31 SCREENING MAMMOGRAM FOR BREAST CANCER: ICD-10-CM

## 2022-09-06 DIAGNOSIS — J30.9 ALLERGIC RHINITIS, UNSPECIFIED SEASONALITY, UNSPECIFIED TRIGGER: ICD-10-CM

## 2022-09-06 DIAGNOSIS — K21.9 GASTROESOPHAGEAL REFLUX DISEASE, UNSPECIFIED WHETHER ESOPHAGITIS PRESENT: Primary | ICD-10-CM

## 2022-09-06 DIAGNOSIS — Z12.39 ENCOUNTER FOR SCREENING FOR MALIGNANT NEOPLASM OF BREAST, UNSPECIFIED SCREENING MODALITY: ICD-10-CM

## 2022-09-06 DIAGNOSIS — Z13.29 SCREENING FOR THYROID DISORDER: ICD-10-CM

## 2022-09-06 DIAGNOSIS — Z12.11 COLON CANCER SCREENING: ICD-10-CM

## 2022-09-06 DIAGNOSIS — Z00.00 ROUTINE ADULT HEALTH MAINTENANCE: ICD-10-CM

## 2022-09-06 DIAGNOSIS — E55.9 VITAMIN D DEFICIENCY, UNSPECIFIED: ICD-10-CM

## 2022-09-06 PROCEDURE — 99214 OFFICE O/P EST MOD 30 MIN: CPT | Mod: PBBFAC

## 2022-09-06 RX ORDER — OMEPRAZOLE 20 MG/1
20 CAPSULE, DELAYED RELEASE ORAL DAILY
Qty: 90 CAPSULE | Refills: 3 | Status: SHIPPED | OUTPATIENT
Start: 2022-09-06 | End: 2023-06-10 | Stop reason: SDUPTHER

## 2022-09-06 RX ORDER — CETIRIZINE HYDROCHLORIDE 10 MG/1
10 TABLET ORAL DAILY
Qty: 90 TABLET | Refills: 3 | Status: SHIPPED | OUTPATIENT
Start: 2022-09-06 | End: 2023-06-10 | Stop reason: SDUPTHER

## 2022-09-06 RX ORDER — ASPIRIN 81 MG/1
81 TABLET ORAL DAILY
COMMUNITY

## 2022-09-06 NOTE — PROGRESS NOTES
I have reviewed and concur with the resident's history, physical, assessment, and plan.  I have discussed with him all issues related to the diagnosis, workup and treatment plan.Care provided as reasonable and necessary.    Warren Corbett MD  [facility]

## 2022-09-06 NOTE — PROGRESS NOTES
"  [facility] INTERNAL MEDICINE  OUTPATIENT OFFICE VISIT NOTE    SUBJECTIVE:      HPI: Ms. Tello is 67-year-old female with PMHx significant for GERD, basal cell carcinoma (s/p excision 6/15) who presents to [facility] IM clinic for routine followup.  Patient last seen in clinic on 1/25/22.  At that time, referred to [facility] minor procedure clinic for skin tags.      Patient has no acute complaints today, reports medication compliance.       ROS:  (+)  (-) Chest pain, palpitations, SOB, fever, night sweats, chills, diarrhea, constipation.       OBJECTIVE:     Vital signs:   /84 (BP Location: Left arm, Patient Position: Sitting)   Pulse 68   Temp 97.9 °F (36.6 °C) (Oral)   Resp 20   Ht 5' 3" (1.6 m)   Wt 106 kg (233 lb 9.6 oz)   SpO2 98%   BMI 41.38 kg/m²      Physical Examination:  General: Well nourished w/o distress  Pulm: CTA bilaterally, normal work of breathing  CV: S1, S2 w/o murmurs or gallops; no edema noted  GI: Soft, non-tender to palpation, with normal bowel sounds in all quadrants, no masses on palpation  MSK: Full ROM of all extremities and spine w/o limitation or discomfort  Derm: No rashes, abnormal bruising, or skin lesions  Neuro: AAOx4;motor/sensory function intact, no gross motor deficits noted on exam   Psych: Cooperative; appropriate mood and affect, answers all questions appropriately       ASSESSMENT & PLAN:   GERD  -esomeprazole (patient buys OTC), patient reports taking PRN  -Patient still reports taking as needed, previous discussions in regards to possible repeat EGD in the future should her symptoms ever worsen    History of right temporal basal cell carcinoma  -(status post excision 6/15)  -Derm referral at previous visit, seen at [facility] clinic, had skin tag removed with concern for actinic keratosis and possible SCC, was referred to plastic surgery for removal by minor procedure clinic     Allergic rhinitis  -patient takes Claritin daily    Health Maintenance  -Completed women's health/Pap " smear 12/21, benign findings  -XR bone density/DEXA scan completed 7/21, findings consistent with osteopenia  -Refusing vaccines today including zoster, Pneumovax  -repeat mammogram ordered today  -cologuard ordered today  -labs today including CBC, CMP, lipid panel, TSH, Vit D levels       Labs today  RTC in 12 months     Edmond Wild,   [facility] PGY3

## 2022-09-19 PROBLEM — Z00.00 WELLNESS EXAMINATION: Status: RESOLVED | Noted: 2022-06-18 | Resolved: 2022-09-19

## 2022-10-25 ENCOUNTER — OFFICE VISIT (OUTPATIENT)
Dept: OPHTHALMOLOGY | Facility: CLINIC | Age: 67
End: 2022-10-25
Payer: MEDICARE

## 2022-10-25 ENCOUNTER — OFFICE VISIT (OUTPATIENT)
Dept: FAMILY MEDICINE | Facility: CLINIC | Age: 67
End: 2022-10-25
Payer: MEDICARE

## 2022-10-25 ENCOUNTER — ANESTHESIA EVENT (OUTPATIENT)
Dept: SURGERY | Facility: HOSPITAL | Age: 67
End: 2022-10-25
Payer: MEDICARE

## 2022-10-25 VITALS
SYSTOLIC BLOOD PRESSURE: 158 MMHG | BODY MASS INDEX: 41.53 KG/M2 | TEMPERATURE: 98 F | RESPIRATION RATE: 18 BRPM | WEIGHT: 234.38 LBS | DIASTOLIC BLOOD PRESSURE: 82 MMHG | HEIGHT: 63 IN | OXYGEN SATURATION: 100 % | HEART RATE: 68 BPM

## 2022-10-25 VITALS — WEIGHT: 233.69 LBS | HEIGHT: 63 IN | BODY MASS INDEX: 41.41 KG/M2

## 2022-10-25 DIAGNOSIS — H53.15 VISUAL DISTORTIONS OF SHAPE AND SIZE: ICD-10-CM

## 2022-10-25 DIAGNOSIS — H26.491 PCO (POSTERIOR CAPSULAR OPACIFICATION), RIGHT: ICD-10-CM

## 2022-10-25 DIAGNOSIS — L57.0 ACTINIC KERATOSIS: ICD-10-CM

## 2022-10-25 DIAGNOSIS — Z96.1 PSEUDOPHAKIA OF RIGHT EYE: ICD-10-CM

## 2022-10-25 DIAGNOSIS — L82.1 SEBORRHEIC KERATOSIS: Primary | ICD-10-CM

## 2022-10-25 DIAGNOSIS — H52.31 ANISOMETROPIA: ICD-10-CM

## 2022-10-25 DIAGNOSIS — H25.812 COMBINED FORMS OF AGE-RELATED CATARACT OF LEFT EYE: Primary | ICD-10-CM

## 2022-10-25 DIAGNOSIS — L98.9 SKIN LESION ON EXAMINATION: ICD-10-CM

## 2022-10-25 DIAGNOSIS — L81.9 PIGMENTED SKIN LESION SUSPICIOUS FOR MALIGNANT NEOPLASM: ICD-10-CM

## 2022-10-25 DIAGNOSIS — Z85.828 HISTORY OF SKIN CANCER: ICD-10-CM

## 2022-10-25 PROCEDURE — 92136 OPHTHALMIC BIOMETRY: CPT | Mod: PBBFAC,PO | Performed by: OPHTHALMOLOGY

## 2022-10-25 PROCEDURE — 76519 ECHO EXAM OF EYE: CPT | Mod: PBBFAC,PO

## 2022-10-25 PROCEDURE — 17110 DESTRUCTION B9 LES UP TO 14: CPT | Mod: PBBFAC

## 2022-10-25 PROCEDURE — 92134 CPTRZ OPH DX IMG PST SGM RTA: CPT | Mod: PBBFAC,PO

## 2022-10-25 PROCEDURE — 17000 DESTRUCT PREMALG LESION: CPT | Mod: PBBFAC

## 2022-10-25 PROCEDURE — 17003 DESTRUCT PREMALG LES 2-14: CPT | Mod: PBBFAC

## 2022-10-25 PROCEDURE — 99214 OFFICE O/P EST MOD 30 MIN: CPT | Mod: PBBFAC,25

## 2022-10-25 PROCEDURE — 99213 OFFICE O/P EST LOW 20 MIN: CPT | Mod: PBBFAC,PO

## 2022-10-25 PROCEDURE — 92134 CPTRZ OPH DX IMG PST SGM RTA: CPT | Mod: PBBFAC,PO | Performed by: OPHTHALMOLOGY

## 2022-10-25 RX ORDER — CYCLOPENTOLATE HYDROCHLORIDE 10 MG/ML
1 SOLUTION/ DROPS OPHTHALMIC
Status: CANCELLED | OUTPATIENT
Start: 2022-10-25 | End: 2022-10-25

## 2022-10-25 RX ORDER — SODIUM CHLORIDE 0.9 % (FLUSH) 0.9 %
10 SYRINGE (ML) INJECTION
Status: DISCONTINUED | OUTPATIENT
Start: 2022-10-25 | End: 2022-11-03 | Stop reason: HOSPADM

## 2022-10-25 RX ORDER — TROPICAMIDE 10 MG/ML
1 SOLUTION/ DROPS OPHTHALMIC
Status: CANCELLED | OUTPATIENT
Start: 2022-10-25 | End: 2022-10-25

## 2022-10-25 RX ORDER — PROPARACAINE HYDROCHLORIDE 5 MG/ML
1 SOLUTION/ DROPS OPHTHALMIC
Status: CANCELLED | OUTPATIENT
Start: 2022-10-25

## 2022-10-25 RX ORDER — PHENYLEPHRINE HYDROCHLORIDE 25 MG/ML
1 SOLUTION/ DROPS OPHTHALMIC
Status: CANCELLED | OUTPATIENT
Start: 2022-10-25 | End: 2022-10-25

## 2022-10-25 NOTE — PATIENT INSTRUCTIONS
F/u PRN  [facility] see biopsy results from 5/24/22

## 2022-10-25 NOTE — PROGRESS NOTES
"  [facility] OFFICE VISIT NOTE  Meseret Tello  [id number]  10/25/2022      Chief Complaint: general skin check      Meseret Tello is a 67 y.o. female  with a PMHx of skin cancer presenting to [facility] for an evaluation of multiple skin lesions. Of note, patient was not called by plastic surgery department for referral regarding R hand biopsy results dated 5/24/22. She now reports itchy skin lesions on her R forearm, R leg, and L leg. She states previous cryotherapy on skin lesion on her R forearm several years ago. Denies irritation, bleeding, and pain. Hx of prolonged sun exposure. Denies fever, chills, nausea, vomiting, and weight loss.     ROS   As stated above    Blood pressure (!) 158/82, pulse 68, temperature 98.2 °F (36.8 °C), resp. rate 18, height 5' 2.99" (1.6 m), weight 106.3 kg (234 lb 5.6 oz), SpO2 100 %.   Physical Exam   Constitutional: She is oriented to person, place, and time. She is cooperative.  Non-toxic appearance. No distress.   Cardiovascular: Normal rate.   Pulmonary/Chest: Effort normal. No respiratory distress.   Musculoskeletal:         General: No tenderness.      Right lower leg: No edema.      Left lower leg: No edema.   Neurological: She is alert and oriented to person, place, and time.   Skin: Skin is warm and dry. Lesion noted. No rash noted. No erythema.   R forearm: 1mm pinpoint, hyperkeratotic papule, black brown crusting    R lower extremity, lateral aspects: [id number] stuck-on, hyperpigmented, black speckled skin lesion, [id number] hyperkeratotic, hypopigmented skin lesion     L lower extremity, posterior aspect: [id number] stuck-on, hyperkeratotic skin lesion    Nursing note and vitals reviewed.    Procedure Note:1  Procedure:  Performed by: Belle Dixon MD   Supervised by: Lory Hamm MD  Consent: signed consent obtained after discussion of risks, benefits, and alternative treatments  Description: Cryotherapy applied to skin lesions. Thaw-freeze cycles for 30 seconds in " total at each lesion of interest.   The patient tolerated the procedure well with no complications.         Assessment:   1. Seborrheic keratosis    2. Actinic keratosis    3. History of skin cancer    4. Pigmented skin lesion suspicious for malignant neoplasm    5. Skin lesion on examination        Plan:  SK  AK  -performed cryotherapy x 4 lesions in clinic today     Hx of skin cancer   Pigmented skin lesion suspicious for malignant neoplasm - see punch biopsy results from 5/24/22  [place] [place] regarding referral for biopsy results, will [place] [facility] [place] final decision       Follow up as needed, F/u with PCP. Waiting for response from plastic surgery department for evaluation and treatment.     Belle Dixon MD   [facility] HO-1

## 2022-10-25 NOTE — PROGRESS NOTES
OCT Macula 10/25/2022  OD: Signal 7/10, , normal foveal contour, no IRF or SRF  OS: Signal 6/10, , normal foveal contour, no IRF or SRF    Assessment /Plan     For exam results, see Encounter Report.    Combined forms of age-related cataract of left eye  -     Vital signs, per unit routine ; Standing  -     Diet NPO; Standing  -     Place in Outpatient; Standing  -     Case Request Operating Room: EXTRACTION, CATARACT, WITH IOL INSERTION  -     Insert peripheral IV; Standing    Anisometropia    Pseudophakia of right eye    PCO (posterior capsular opacification), right    Other orders  -     sodium chloride 0.9% flush 10 mL  -     proparacaine 0.5 % ophthalmic solution 1 drop  -     cyclopentolate 1% ophthalmic solution 1 drop  -     phenylephrine HCL 2.5% ophthalmic solution 1 drop  -     tropicamide 1% ophthalmic solution 1 drop    1. Age-related cataract, combined forms, left eye  - Anisometropia since cataract surgery in the right eye   - 10/25/22: BCVA 20/40, BAT medium 20/40   - Patient with visually significant cataract and desires surgical removal. The risks/benefits/alternatives of cataract extraction with placement of an intraocular lens were discussed with the patient, including, but not limited to infection, bleeding, loss of vision, loss of the eye, overcorrection, undercorrection, need for further surgery. After the opportunity to ask questions, the patient elected to proceed. A consent document was signed, witnessed, and placed in the patient's chart.  - MRX done (10/25/22): BCVA: 20/40; BAT to 20/40  - IOP 14  - Trauma: No  - Guttae: No  - Phaco/iridodonesis: No  - Trypan blue: No  - Dilation: good  - Anticoagulant/antiplatelet use: None  - Cooperative with exam: Pt able to lie flat for 30 minutes, will plan to do under local  - Comorbidities: GERD  - Lens to be used: +24.50 [id number] to aim for final targeted refraction of -.58 diopters post-op  - Date of surgery: 11/3/22 with Dr. Fontanez  and Dr Blandon  - RTC: POD1, POW1     2. Pseudophakia, right eye  3. PCO, right eye  - DOS: 11/9/21  - BCVA 20/25, patient happy with vision

## 2022-10-25 NOTE — ANESTHESIA PREPROCEDURE EVALUATION
"                                                                                                             10/25/2022  Meseret Tello is a 67 y.o., female.    COVID STATUS: 8/23/21 COVID +;  BETA-BLOCKER: NONE    PAT NURSE PHONE INTERVIEW 10/27/22    PROBLEM LIST:  -  LEFT EYE CATARACT      - S/P 11/9/21 RIGHT PHACO/IOL MAC NAA  -  MORBID OBESITY  -  HTN (PREV. on BENICAR (2019/2020)  -  HLD (NO MEDs)  -  GERD  -  12/30/21 XRAY C-SPINE = severe disc height loss at C5-C7, moderate at C3-C5, with multilevel marginal osteophyte formation. There is moderate bilateral facet arthropathy  -  PAST SMOKER - QUIT @ AGE 21, <2 PPY - NEB Tx 1x/MONTH  -  ETOH "RARELY"    AM Rx DOS: OMEPRAZOLE, NEB Tx PRN    ORDERS -   SURGEON: 5/21/21 CXR, EKG; 9/6/22 CBC, CMP, TSH; NO NEW ORDERS  ANESTHESIA: NONE  SEEN [facility] CARDs LASTLY 6/7/16 & D/C'd to F/U PRN  Pre-op Assessment    I have reviewed the Patient Summary Reports.     I have reviewed the Nursing Notes. I have reviewed the NPO Status.   I have reviewed the Medications.     Review of Systems  Anesthesia Hx:  No problems with previous Anesthesia  History of prior surgery of interest to airway management or planning: Denies Family Hx of Anesthesia complications.   Denies Personal Hx of Anesthesia complications.   Hematology/Oncology:  Hematology Normal   Oncology Normal     EENT/Dental:EENT/Dental Normal   Cardiovascular:  Cardiovascular Normal     Pulmonary:  Pulmonary Normal    Renal/:  Renal/ Normal     Hepatic/GI:  Hepatic/GI Normal    Musculoskeletal:  Musculoskeletal Normal    Neurological:  Neurology Normal    Endocrine:  Endocrine Normal    Dermatological:  Skin Normal    Psych:  Psychiatric Normal           Physical Exam  General: Cooperative, Well nourished, Alert and Oriented    Airway:  Mallampati: I / I  Mouth Opening: Normal  TM Distance: Normal  Tongue: Normal  Neck ROM: Normal ROM    Dental:  Loose teeth        Anesthesia Plan  Type of Anesthesia, risks & " benefits discussed:    Anesthesia Type: Gen Natural Airway  Intra-op Monitoring Plan: Standard ASA Monitors  Post Op Pain Control Plan: IV/PO Opioids PRN  (medical reason for not using multimodal pain management)  Induction:  IV  Informed Consent: Informed consent signed with the Patient and all parties understand the risks and agree with anesthesia plan.  All questions answered. Patient consented to blood products? No  ASA Score: 3  Day of Surgery Review of History & Physical: H&P Update referred to the surgeon/provider.    Ready For Surgery From Anesthesia Perspective.     .    Lab Results   Component Value Date    WBC 6.8 09/06/2022    HGB 14.6 09/06/2022    HCT 43.2 09/06/2022    MCV 91.5 09/06/2022     09/06/2022     CMP  Sodium Level   Date Value Ref Range Status   09/06/2022 142 136 - 145 mmol/L Final     Potassium Level   Date Value Ref Range Status   09/06/2022 4.7 3.5 - 5.1 mmol/L Final     Carbon Dioxide   Date Value Ref Range Status   09/06/2022 32 (H) 23 - 31 mmol/L Final     Blood Urea Nitrogen   Date Value Ref Range Status   09/06/2022 12.3 9.8 - 20.1 mg/dL Final     Creatinine   Date Value Ref Range Status   09/06/2022 0.80 0.55 - 1.02 mg/dL Final     Calcium Level Total   Date Value Ref Range Status   09/06/2022 10.7 (H) 8.4 - 10.2 mg/dL Final     Albumin Level   Date Value Ref Range Status   09/06/2022 3.8 3.4 - 4.8 gm/dL Final     Bilirubin Total   Date Value Ref Range Status   09/06/2022 0.5 <=1.5 mg/dL Final     Alkaline Phosphatase   Date Value Ref Range Status   09/06/2022 75 40 - 150 unit/L Final     Aspartate Aminotransferase   Date Value Ref Range Status   09/06/2022 13 5 - 34 unit/L Final     Alanine Aminotransferase   Date Value Ref Range Status   09/06/2022 15 0 - 55 unit/L Final     Estimated GFR-Non    Date Value Ref Range Status   03/06/2018 90 >>=90 mL/min Final     Lab Results   Component Value Date    TSH 2.0034 09/06/2022 12/29/14 C      12/18/14  ECHO      5/21/21 CXR  FINDINGS:  Examination reveals mediastinal and cardiac silhouettes to be within  normal limits. Lung fields are clear and free of gross infiltrates  atelectases or effusions     IMPRESSION: No active pulmonary disease

## 2022-10-25 NOTE — H&P
Pre-Operative History & Physical  Ophthalmology      SUBJECTIVE:     History of Present Illness:  Patient is a 67 y.o. female presents with visually-significant left eye age-related cataract.    MEDICATIONS: (Not in a hospital admission)      ALLERGIES: Review of patient's allergies indicates:  No Known Allergies    PAST MEDICAL HISTORY:   Past Medical History:   Diagnosis Date    Allergy     GERD (gastroesophageal reflux disease)      PAST SURGICAL HISTORY:   Past Surgical History:   Procedure Laterality Date    APPENDECTOMY      EYE SURGERY  November 2021    SKIN CANCER EXCISION       PAST FAMILY HISTORY:   Family History   Problem Relation Age of Onset    Heart failure Mother     Cancer Father     No Known Problems Sister     No Known Problems Brother      SOCIAL HISTORY:   Social History     Tobacco Use    Smoking status: Former     Types: Cigarettes    Smokeless tobacco: Never    Tobacco comments:     Smoked 2 years as reanager   Substance Use Topics    Alcohol use: Not Currently     Alcohol/week: 1.0 standard drink     Types: 1 Drinks containing 0.5 oz of alcohol per week     Comment: Occasionally    Drug use: Never        MENTAL STATUS: Alert    REVIEW OF SYSTEMS: Negative    OBJECTIVE:     Vital Signs (Most Recent)       Physical Exam:  General: NAD  HEENT: AT/NC, cataract left eye, see clinic visit note 10/25/22  Lungs: Adequate respirations  Heart: + pulses  Abdomen: Soft    ASSESSMENT/PLAN:     Patient is a 67 y.o. female with left eye age-related cataract, visually significant.    - Plan for CEIOL left eye (OS).   - Allergies reviewed: Review of patient's allergies indicates:  No Known Allergies  - Risks/benefits/alternatives of the procedure including, but not limited to scarring, bleeding, infection, loss or decreased vision, and/or need for possible repeat surgery discussed with the patient and family.  - Informed consent obtained prior to surgery and the patient voiced good understanding.      MD Stew  [facility]

## 2022-10-26 PROBLEM — H25.812 COMBINED FORMS OF AGE-RELATED CATARACT OF LEFT EYE: Status: ACTIVE | Noted: 2022-10-26

## 2022-10-27 PROBLEM — J30.9 ALLERGIC RHINITIS: Status: ACTIVE | Noted: 2022-10-27

## 2022-10-27 PROBLEM — I10 HYPERTENSION: Status: ACTIVE | Noted: 2022-10-27

## 2022-10-27 PROBLEM — E66.9 OBESITY: Status: ACTIVE | Noted: 2022-10-27

## 2022-10-27 PROBLEM — K21.9 GASTROESOPHAGEAL REFLUX DISEASE: Status: ACTIVE | Noted: 2022-10-27

## 2022-10-27 RX ORDER — CHOLECALCIFEROL (VITAMIN D3) 25 MCG
1000 TABLET ORAL DAILY
COMMUNITY

## 2022-11-02 RX ORDER — MEPERIDINE HYDROCHLORIDE 25 MG/ML
12.5 INJECTION INTRAMUSCULAR; INTRAVENOUS; SUBCUTANEOUS ONCE
Status: CANCELLED | OUTPATIENT
Start: 2022-11-02 | End: 2022-11-02

## 2022-11-02 RX ORDER — DIPHENHYDRAMINE HYDROCHLORIDE 50 MG/ML
25 INJECTION INTRAMUSCULAR; INTRAVENOUS ONCE AS NEEDED
Status: CANCELLED | OUTPATIENT
Start: 2022-11-02 | End: 2034-03-31

## 2022-11-02 RX ORDER — HYDROMORPHONE HYDROCHLORIDE 1 MG/ML
0.5 INJECTION, SOLUTION INTRAMUSCULAR; INTRAVENOUS; SUBCUTANEOUS EVERY 5 MIN PRN
Status: CANCELLED | OUTPATIENT
Start: 2022-11-02

## 2022-11-02 RX ORDER — HYDROMORPHONE HYDROCHLORIDE 1 MG/ML
0.2 INJECTION, SOLUTION INTRAMUSCULAR; INTRAVENOUS; SUBCUTANEOUS EVERY 5 MIN PRN
Status: CANCELLED | OUTPATIENT
Start: 2022-11-02

## 2022-11-02 RX ORDER — ONDANSETRON 2 MG/ML
4 INJECTION INTRAMUSCULAR; INTRAVENOUS ONCE
Status: CANCELLED | OUTPATIENT
Start: 2022-11-02 | End: 2022-11-02

## 2022-11-02 RX ORDER — PROCHLORPERAZINE EDISYLATE 5 MG/ML
5 INJECTION INTRAMUSCULAR; INTRAVENOUS ONCE AS NEEDED
Status: CANCELLED | OUTPATIENT
Start: 2022-11-02 | End: 2034-03-31

## 2022-11-02 RX ORDER — OXYCODONE AND ACETAMINOPHEN 5; 325 MG/1; MG/1
2 TABLET ORAL ONCE
Status: CANCELLED | OUTPATIENT
Start: 2022-11-02 | End: 2022-11-02

## 2022-11-02 RX ORDER — IPRATROPIUM BROMIDE AND ALBUTEROL SULFATE 2.5; .5 MG/3ML; MG/3ML
3 SOLUTION RESPIRATORY (INHALATION) ONCE AS NEEDED
Status: CANCELLED | OUTPATIENT
Start: 2022-11-02 | End: 2034-03-31

## 2022-11-03 ENCOUNTER — ANESTHESIA (OUTPATIENT)
Dept: SURGERY | Facility: HOSPITAL | Age: 67
End: 2022-11-03
Payer: MEDICARE

## 2022-11-03 ENCOUNTER — HOSPITAL ENCOUNTER (OUTPATIENT)
Facility: HOSPITAL | Age: 67
Discharge: HOME | End: 2022-11-03
Attending: OPHTHALMOLOGY | Admitting: OPHTHALMOLOGY
Payer: MEDICARE

## 2022-11-03 VITALS
WEIGHT: 231.69 LBS | TEMPERATURE: 98 F | HEART RATE: 63 BPM | DIASTOLIC BLOOD PRESSURE: 63 MMHG | SYSTOLIC BLOOD PRESSURE: 129 MMHG | BODY MASS INDEX: 41.05 KG/M2 | RESPIRATION RATE: 20 BRPM | OXYGEN SATURATION: 100 %

## 2022-11-03 DIAGNOSIS — H25.812 COMBINED FORMS OF AGE-RELATED CATARACT OF LEFT EYE: ICD-10-CM

## 2022-11-03 PROCEDURE — V2632 POST CHMBR INTRAOCULAR LENS: HCPCS | Performed by: OPHTHALMOLOGY

## 2022-11-03 PROCEDURE — 25000003 PHARM REV CODE 250: Performed by: OPHTHALMOLOGY

## 2022-11-03 PROCEDURE — 37000009 HC ANESTHESIA EA ADD 15 MINS: Performed by: OPHTHALMOLOGY

## 2022-11-03 PROCEDURE — 37000008 HC ANESTHESIA 1ST 15 MINUTES: Performed by: OPHTHALMOLOGY

## 2022-11-03 PROCEDURE — 36000706: Performed by: OPHTHALMOLOGY

## 2022-11-03 PROCEDURE — 71000016 HC POSTOP RECOV ADDL HR: Performed by: OPHTHALMOLOGY

## 2022-11-03 PROCEDURE — 25000003 PHARM REV CODE 250

## 2022-11-03 PROCEDURE — 27201423 OPTIME MED/SURG SUP & DEVICES STERILE SUPPLY: Performed by: OPHTHALMOLOGY

## 2022-11-03 PROCEDURE — 71000015 HC POSTOP RECOV 1ST HR: Performed by: OPHTHALMOLOGY

## 2022-11-03 PROCEDURE — 25000003 PHARM REV CODE 250: Performed by: SPECIALIST

## 2022-11-03 PROCEDURE — 36000707: Performed by: OPHTHALMOLOGY

## 2022-11-03 PROCEDURE — 63600175 PHARM REV CODE 636 W HCPCS: Performed by: NURSE ANESTHETIST, CERTIFIED REGISTERED

## 2022-11-03 RX ORDER — PROCHLORPERAZINE EDISYLATE 5 MG/ML
5 INJECTION INTRAMUSCULAR; INTRAVENOUS ONCE AS NEEDED
Status: DISCONTINUED | OUTPATIENT
Start: 2022-11-03 | End: 2022-11-03 | Stop reason: HOSPADM

## 2022-11-03 RX ORDER — LIDOCAINE HYDROCHLORIDE 10 MG/ML
1 INJECTION, SOLUTION EPIDURAL; INFILTRATION; INTRACAUDAL; PERINEURAL ONCE
Status: DISCONTINUED | OUTPATIENT
Start: 2022-11-03 | End: 2022-11-03 | Stop reason: HOSPADM

## 2022-11-03 RX ORDER — FENTANYL CITRATE 50 UG/ML
INJECTION, SOLUTION INTRAMUSCULAR; INTRAVENOUS
Status: DISCONTINUED | OUTPATIENT
Start: 2022-11-03 | End: 2022-11-03

## 2022-11-03 RX ORDER — MOXIFLOXACIN 5 MG/ML
SOLUTION/ DROPS OPHTHALMIC
Status: DISCONTINUED | OUTPATIENT
Start: 2022-11-03 | End: 2022-11-03 | Stop reason: HOSPADM

## 2022-11-03 RX ORDER — DIPHENHYDRAMINE HYDROCHLORIDE 50 MG/ML
25 INJECTION INTRAMUSCULAR; INTRAVENOUS ONCE AS NEEDED
Status: DISCONTINUED | OUTPATIENT
Start: 2022-11-03 | End: 2022-11-03 | Stop reason: HOSPADM

## 2022-11-03 RX ORDER — PREDNISOLONE ACETATE 10 MG/ML
SUSPENSION/ DROPS OPHTHALMIC
Status: DISCONTINUED | OUTPATIENT
Start: 2022-11-03 | End: 2022-11-03 | Stop reason: HOSPADM

## 2022-11-03 RX ORDER — ONDANSETRON 2 MG/ML
4 INJECTION INTRAMUSCULAR; INTRAVENOUS ONCE
Status: DISCONTINUED | OUTPATIENT
Start: 2022-11-03 | End: 2022-11-03 | Stop reason: HOSPADM

## 2022-11-03 RX ORDER — MIDAZOLAM HYDROCHLORIDE 1 MG/ML
INJECTION INTRAMUSCULAR; INTRAVENOUS
Status: DISCONTINUED | OUTPATIENT
Start: 2022-11-03 | End: 2022-11-03

## 2022-11-03 RX ORDER — CYCLOPENTOLATE HYDROCHLORIDE 10 MG/ML
1 SOLUTION/ DROPS OPHTHALMIC
Status: COMPLETED | OUTPATIENT
Start: 2022-11-03 | End: 2022-11-03

## 2022-11-03 RX ORDER — IPRATROPIUM BROMIDE AND ALBUTEROL SULFATE 2.5; .5 MG/3ML; MG/3ML
3 SOLUTION RESPIRATORY (INHALATION) ONCE AS NEEDED
Status: DISCONTINUED | OUTPATIENT
Start: 2022-11-03 | End: 2022-11-03 | Stop reason: HOSPADM

## 2022-11-03 RX ORDER — LIDOCAINE HYDROCHLORIDE 20 MG/ML
INJECTION, SOLUTION EPIDURAL; INFILTRATION; INTRACAUDAL; PERINEURAL
Status: DISCONTINUED | OUTPATIENT
Start: 2022-11-03 | End: 2022-11-03 | Stop reason: HOSPADM

## 2022-11-03 RX ORDER — SODIUM CHLORIDE 9 MG/ML
INJECTION, SOLUTION INTRAVENOUS CONTINUOUS
Status: DISCONTINUED | OUTPATIENT
Start: 2022-11-03 | End: 2022-11-03 | Stop reason: HOSPADM

## 2022-11-03 RX ORDER — FLURBIPROFEN SODIUM 0.3 MG/ML
SOLUTION/ DROPS OPHTHALMIC
Status: DISCONTINUED | OUTPATIENT
Start: 2022-11-03 | End: 2022-11-03 | Stop reason: HOSPADM

## 2022-11-03 RX ORDER — PHENYLEPHRINE HYDROCHLORIDE 25 MG/ML
1 SOLUTION/ DROPS OPHTHALMIC
Status: COMPLETED | OUTPATIENT
Start: 2022-11-03 | End: 2022-11-03

## 2022-11-03 RX ORDER — DIAZEPAM 5 MG/1
10 TABLET ORAL ONCE
Status: DISCONTINUED | OUTPATIENT
Start: 2022-11-03 | End: 2022-11-03 | Stop reason: HOSPADM

## 2022-11-03 RX ORDER — MEPERIDINE HYDROCHLORIDE 25 MG/ML
12.5 INJECTION INTRAMUSCULAR; INTRAVENOUS; SUBCUTANEOUS ONCE
Status: DISCONTINUED | OUTPATIENT
Start: 2022-11-03 | End: 2022-11-03 | Stop reason: HOSPADM

## 2022-11-03 RX ORDER — DIAZEPAM 5 MG/1
TABLET ORAL
Status: DISCONTINUED
Start: 2022-11-03 | End: 2022-11-03 | Stop reason: HOSPADM

## 2022-11-03 RX ORDER — DIAZEPAM 5 MG/1
10 TABLET ORAL ONCE
Status: COMPLETED | OUTPATIENT
Start: 2022-11-03 | End: 2022-11-03

## 2022-11-03 RX ORDER — HYDROMORPHONE HYDROCHLORIDE 1 MG/ML
0.2 INJECTION, SOLUTION INTRAMUSCULAR; INTRAVENOUS; SUBCUTANEOUS EVERY 5 MIN PRN
Status: DISCONTINUED | OUTPATIENT
Start: 2022-11-03 | End: 2022-11-03 | Stop reason: HOSPADM

## 2022-11-03 RX ORDER — TROPICAMIDE 10 MG/ML
1 SOLUTION/ DROPS OPHTHALMIC
Status: COMPLETED | OUTPATIENT
Start: 2022-11-03 | End: 2022-11-03

## 2022-11-03 RX ORDER — PROPARACAINE HYDROCHLORIDE 5 MG/ML
1 SOLUTION/ DROPS OPHTHALMIC
Status: DISCONTINUED | OUTPATIENT
Start: 2022-11-03 | End: 2022-11-03 | Stop reason: HOSPADM

## 2022-11-03 RX ORDER — OXYCODONE AND ACETAMINOPHEN 5; 325 MG/1; MG/1
2 TABLET ORAL ONCE
Status: DISCONTINUED | OUTPATIENT
Start: 2022-11-03 | End: 2022-11-03 | Stop reason: HOSPADM

## 2022-11-03 RX ORDER — HYDROMORPHONE HYDROCHLORIDE 1 MG/ML
0.5 INJECTION, SOLUTION INTRAMUSCULAR; INTRAVENOUS; SUBCUTANEOUS EVERY 5 MIN PRN
Status: DISCONTINUED | OUTPATIENT
Start: 2022-11-03 | End: 2022-11-03 | Stop reason: HOSPADM

## 2022-11-03 RX ADMIN — DIAZEPAM 10 MG: 5 TABLET ORAL at 09:11

## 2022-11-03 RX ADMIN — TROPICAMIDE 1 DROP: 10 SOLUTION/ DROPS OPHTHALMIC at 07:11

## 2022-11-03 RX ADMIN — FENTANYL CITRATE 50 MCG: 50 INJECTION, SOLUTION INTRAMUSCULAR; INTRAVENOUS at 10:11

## 2022-11-03 RX ADMIN — MIDAZOLAM 2 MG: 1 INJECTION INTRAMUSCULAR; INTRAVENOUS at 10:11

## 2022-11-03 RX ADMIN — CYCLOPENTOLATE HYDROCHLORIDE 1 DROP: 10 SOLUTION/ DROPS OPHTHALMIC at 07:11

## 2022-11-03 RX ADMIN — SODIUM CHLORIDE: 9 INJECTION, SOLUTION INTRAVENOUS at 09:11

## 2022-11-03 RX ADMIN — PHENYLEPHRINE HYDROCHLORIDE 1 DROP: 25 SOLUTION/ DROPS OPHTHALMIC at 07:11

## 2022-11-03 RX ADMIN — PROPARACAINE HYDROCHLORIDE 1 DROP: 5 SOLUTION/ DROPS OPHTHALMIC at 07:11

## 2022-11-03 NOTE — OP NOTE
Operative Note  Ophthalmology Service    Date of Procedure:  11/3/2022    Surgeon:  Ramiro Fontanez MD    Staff Physician: Armin Blandon MD    Pre-Operative Diagnosis: Cataract OS    Post-Operative Diagnosis: Same as pre-operative diagnosis    Treatments/Procedures Performed:   1. Cataract extraction with phacoemulsification and posterior chamber intraocular lens placement OS    Intraoperative Findings: Cataract    Anesthesia: Monitored anesthesia care.    Complications: None    Estimated Blood Loss: None    Implant:    Implant Name Type Inv. Item Serial No.  Lot No. LRB No. Used Action   enVista Hydrophobic Acrylic Intraocular Lens +24.50d   [id number]   Left 1 Implanted          Indication for Procedure:  The patient had a history of painless progressive vision loss interfering with activities of daily living.  Risks, benefits, alternatives, and complications were discussed thoroughly with the patient. After the opportunity to ask questions, the patient expressed understanding and a desire to proceed with the procedure. Informed consent was obtained, signed, and witnessed, and placed in the chart prior.     Procedure in detail:   The patient was brought to the operating room and placed in supine position.  A time out was performed including patient's name, date of birth, anticipated procedure, surgical site location, and allergies.  After adequate anesthesia was achieved, the patient was prepped and draped in sterile fashion using topical Betadine.     A sideport blade was used to make a paracentesis wound. Omidria was injected into the anterior chamber, followed by Viscoat. A 2.5 mm keratome blade was then used to make a clear corneal triplanar wound. A cystotome was used to make a tear in the anterior capsular flap, which was continued around with Utrata forceps to complete a continuous curvilinear capsulorhexis. BSS was then used for hydrodissection and hydrodelineation. The lens was  noted to spin freely in the bag. The lens was then removed in a Divide and Conquer manner with the phacoemulsification handpiece. Irrigation and aspiration handpiece was then used to remove the remaining cortical material. Provisc was then used to fill the capsular bag and the lens as mentioned above was placed in the bag. The I&A was used to remove the remaining Provisc, and the wounds were hydrated with BSS. The wounds were noted to be watertight. The eye was noted to have a good physiological pressure.     The lid speculum was removed under direct visualization. Topical Vigamox, Pred-Forte, and Acular eye drops were placed in the eye. The eye was then covered with a shield and patch. The patient tolerated the procedure well without complications. The patient was brought to the recovery room in stable condition.  The patient was given instructions regarding postoperative care and the importance of follow-up.    The attending physician, Dr. Blandon, was scrubbed and present for the entire procedure.

## 2022-11-03 NOTE — DISCHARGE INSTRUCTIONS
· Pl· Keep follow up appointment tomorrow 11/4/22 at 1:45pm at the [facility]. Resume home medications unless otherwise instructed by your doctor.    · No bending, lifting, stooping or straining until cleared by MD.    · Keep patch on until follow up appointment and while asleep at home to protect your eye.    · May take Tylenol or Ibuprofen for pain or discomfort if no allergies or contraindications.      ` No driving or consuming alcohol for 24 hours after anesthesia.    · Notify MD if you experience:    · Pain that is unrelieved by over the counter medicines    · if you feel increased pressure in your eye or sharp pain in the eye    · you have excessive, colored, or thick drainage coming from eye    · you see curtain-like darkening in the eye, flashes of light, or other sudden vision changes    · if you have any nausea or vomiting    · fever above 100.4F    · The clinics number is [phone]. If it is after business hours or emergency call [phone].  Thanks for choosing [facility]! Have a smooth recovery!

## 2022-11-03 NOTE — DISCHARGE SUMMARY
Discharge Summary     SUMMARY     Surgery Date: 11/3/2022     Surgeon(s) and Role:     * Armin Blandon MD - Attending Physician     * Ramiro Fontanez MD- Primary, Resident    Pre-op Diagnosis:  Combined form of age-related cataract, left eye    Post-op Diagnosis: Same    Procedure(s) (LRB):  EXTRACTION, CATARACT, WITH IOL INSERTION (Left)    Anesthesia: Local MAC    Findings/Key Components:  Visually significant cataract, left eye    Estimated Blood Loss: * No values recorded between 11/3/2022 10:36 AM and 11/3/2022 11:22 AM *    Implant Name Type Inv. Item Serial No.  Lot No. LRB No. Used Action   enVista Hydrophobic Acrylic Intraocular Lens +24.50d   [id number]   Left 1 Implanted            Specimens (From admission, onward)      None              Discharge Note      SUMMARY     Admit Date: 11/3/2022    Attending Physician: Armin Blandon MD     Discharge Physician: Armin Blandon MD    Discharge Date: 11/3/2022     Final Diagnosis: Post-Op Diagnosis Codes:     * Combined forms of age-related cataract of left eye [H25.812]    Hospital Course: Patient was admitted for an outpatient procedure and tolerated the procedure well with no complications.    Disposition: Home or Self Care    Patient Instructions:   Current Discharge Medication List        CONTINUE these medications which have NOT CHANGED    Details   aspirin (ECOTRIN) 81 MG EC tablet Take 81 mg by mouth once daily.      cetirizine (ZYRTEC) 10 MG tablet Take 1 tablet (10 mg total) by mouth once daily.  Qty: 90 tablet, Refills: 3      multivitamin with minerals tablet Take 1 tablet by mouth once daily.      omeprazole (PRILOSEC) 20 MG capsule Take 1 capsule (20 mg total) by mouth once daily.  Qty: 90 capsule, Refills: 3      vitamin D (VITAMIN D3) 1000 units Tab Take 1,000 Units by mouth once daily.      albuterol (PROVENTIL) 2.5 mg /3 mL (0.083 %) nebulizer solution Inhale 2.5 mg into the lungs.      tiZANidine (ZANAFLEX) 4 MG  tablet Take 4 mg by mouth.             Discharge Procedure Orders (must include Diet, Follow-up, Activity)   Discharge Procedure Orders (must include Diet, Follow-up, Activity)   Other restrictions (specify):   Order Comments: Avoid heavy lifting, bending over, or other strenuous activity.     Leave dressing on - Keep it clean, dry, and intact until clinic visit

## 2022-11-03 NOTE — PROGRESS NOTES
Assessment /Plan     For exam results, see Encounter Report.    Postoperative eye state    PCO (posterior capsular opacification), right    Pseudophakia of right eye    Other orders  -     fluorescein ophthalmic strip 1 each           Assessment/Plan:     1) s/p phaco/IOL OS, POD #1  - +24.50 MX60E to aim for final targeted refraction of -.58   - Shield removed in office, patient doing well, with some K edema  - IOP wnl, wound Mayra neg, IOL in place  - Start:   - Vigamox QID   - Pred Forte QID   - Acular QID   - Maxitrol QHS  - Wear eye shield when sleeping/QHS for the next week  - Wear protective glasses during the day at all times  - No bending, lifting, stooping, straining or eye rubbing  - Endophthalmitis and RD precautions reviewed    2. Pseudophakia, right eye  3. PCO, right eye  - DOS: 11/9/21  - BCVA 20/25, patient happy with vision     RTC 1 week for POW CEIOL, sooner as needed

## 2022-11-03 NOTE — ANESTHESIA POSTPROCEDURE EVALUATION
Anesthesia Post Evaluation    Patient: Meseret Tello    Procedure(s) Performed: Procedure(s) (LRB):  EXTRACTION, CATARACT, WITH IOL INSERTION (Left)    Final Anesthesia Type: MAC      Patient location during evaluation: PACU  Patient participation: Yes- Able to Participate  Level of consciousness: awake and alert and oriented  Post-procedure vital signs: reviewed and stable  Pain management: adequate  Airway patency: patent    PONV status at discharge: No PONV  Anesthetic complications: no      Cardiovascular status: blood pressure returned to baseline and stable  Respiratory status: unassisted, spontaneous ventilation and room air  Hydration status: euvolemic  Follow-up not needed.          Vitals Value Taken Time   BP  11/03/22 1126   Temp  11/03/22 1126   Pulse  11/03/22 1126   Resp  11/03/22 1126   SpO2  11/03/22 1126         No case tracking events are documented in the log.      Pain/Jaspal Score: No data recorded

## 2022-11-03 NOTE — BRIEF OP NOTE
Brief Operative Note  Ophthalmology Service    Date of Procedure: 11/3/2022     Attending Physician: Armin Blandon MD    Assistant: Ramiro Fontanez MD    Pre-Operative Diagnosis: Combined forms of age-related cataract of left eye [H25.812]     Post-Operative Diagnosis: Same as pre-operative diagnosis    Treatments/Procedures:   Procedure(s) (LRB):  EXTRACTION, CATARACT, WITH IOL INSERTION (Left)    Intraoperative Findings: Visually significant cataract, left eye    Anesthesia: Monitored anesthesia care    Complications: None    Estimated Blood Loss: < 5 cc    Specimens: None    -------------------------------------------------------------  Full dictated Operative Report to follow.  -------------------------------------------------------------

## 2022-11-03 NOTE — TRANSFER OF CARE
Anesthesia Transfer of Care Note    Patient: Meseret Tello    Procedure(s) Performed: Procedure(s) (LRB):  EXTRACTION, CATARACT, WITH IOL INSERTION (Left)    Patient location: PACU    Anesthesia Type: MAC    Transport from OR: Transported from OR on room air with adequate spontaneous ventilation    Post pain: adequate analgesia    Post assessment: no apparent anesthetic complications and tolerated procedure well    Post vital signs: stable    Level of consciousness: awake, oriented and alert    Nausea/Vomiting: no nausea/vomiting    Complications: none    Transfer of care protocol was followedComments: Report to Candelario HENSON      Last vitals:   133/88 p 70 r 12 sat 95 ra t 36

## 2022-11-03 NOTE — H&P
Pre-Operative History & Physical  Ophthalmology      SUBJECTIVE:     History of Present Illness:  Patient is a 67 y.o. female presents with visually-significant left eye age-related cataract.    MEDICATIONS:   Facility-Administered Medications Prior to Admission   Medication    sodium chloride 0.9% flush 10 mL     PTA Medications   Medication Sig    aspirin (ECOTRIN) 81 MG EC tablet Take 81 mg by mouth once daily.    cetirizine (ZYRTEC) 10 MG tablet Take 1 tablet (10 mg total) by mouth once daily.    multivitamin with minerals tablet Take 1 tablet by mouth once daily.    omeprazole (PRILOSEC) 20 MG capsule Take 1 capsule (20 mg total) by mouth once daily.    vitamin D (VITAMIN D3) 1000 units Tab Take 1,000 Units by mouth once daily.    albuterol (PROVENTIL) 2.5 mg /3 mL (0.083 %) nebulizer solution Inhale 2.5 mg into the lungs.    tiZANidine (ZANAFLEX) 4 MG tablet Take 4 mg by mouth.         ALLERGIES: Review of patient's allergies indicates:  No Known Allergies    PAST MEDICAL HISTORY:   Past Medical History:   Diagnosis Date    Allergy     GERD (gastroesophageal reflux disease)      PAST SURGICAL HISTORY:   Past Surgical History:   Procedure Laterality Date    APPENDECTOMY      EYE SURGERY Right 11/09/2021    RIGHT PHACO/IOL    SKIN CANCER EXCISION       PAST FAMILY HISTORY:   Family History   Problem Relation Age of Onset    Heart failure Mother     Cancer Father     No Known Problems Sister     No Known Problems Brother      SOCIAL HISTORY:   Social History     Tobacco Use    Smoking status: Former     Packs/day: 0.25     Years: 3.00     Pack years: 0.75     Types: Cigarettes    Smokeless tobacco: Never    Tobacco comments:     Smoked 2 years as reanager   Substance Use Topics    Alcohol use: Not Currently     Alcohol/week: 1.0 standard drink     Types: 1 Drinks containing 0.5 oz of alcohol per week     Comment: Occasionally    Drug use: Never        MENTAL STATUS: Alert    REVIEW OF SYSTEMS:  Negative    OBJECTIVE:     Vital Signs (Most Recent)  Temp: 97.5 °F (36.4 °C) (11/03/22 0705)  Pulse: (!) 59 (11/03/22 0705)  BP: 136/79 (11/03/22 0745)  SpO2: 96 % (11/03/22 0705)    Physical Exam:  General: NAD  HEENT: AT/NC, cataract left eye, see clinic visit note 10/25/22  Lungs: Adequate respirations  Heart: + pulses  Abdomen: Soft    ASSESSMENT/PLAN:     Patient is a 67 y.o. female with left eye age-related cataract, visually significant.    - Plan for CEIOL left eye (OS).   - Allergies reviewed: Review of patient's allergies indicates:  No Known Allergies  - Risks/benefits/alternatives of the procedure including, but not limited to scarring, bleeding, infection, loss or decreased vision, and/or need for possible repeat surgery discussed with the patient and family.  - Informed consent obtained prior to surgery and the patient voiced good understanding.    Pt seen and examined at [facility].  H&P reviewed, no changes.  All questions were answered.  Will proceed as planned.      Current Facility-Administered Medications:     0.9%  NaCl infusion, , Intravenous, Continuous, Katelynn Sahu MD    0.9%  NaCl infusion, , Intravenous, Continuous, Katelynn Sahu MD    0.9%  NaCl infusion, , Intravenous, Continuous, Katelynn Sahu MD    0.9%  NaCl infusion, , Intravenous, Continuous, Katelynn Sahu MD    diazePAM tablet 10 mg, 10 mg, Oral, Once, Katelynn Sahu MD    diazePAM tablet 10 mg, 10 mg, Oral, Once, Katelynn Sahu MD    LIDOcaine (PF) 10 mg/ml (1%) injection 10 mg, 1 mL, Intradermal, Once, MARIBEL Patel    LIDOcaine (PF) 10 mg/ml (1%) injection 10 mg, 1 mL, Intradermal, Once, Katelynn Sahu MD    LIDOcaine (PF) 10 mg/ml (1%) injection 10 mg, 1 mL, Intradermal, Once, Katelynn Sahu MD    proparacaine 0.5 % ophthalmic solution 1 drop, 1 drop, Left Eye, On Call Procedure, Lama Stew MD, 1 drop at 11/03/22 0745    Ramiro Fontanez MD  [facility] Ophthalmology Resident,  PGY-4  11/03/2022  8:50 AM

## 2022-11-04 ENCOUNTER — OFFICE VISIT (OUTPATIENT)
Dept: OPHTHALMOLOGY | Facility: CLINIC | Age: 67
End: 2022-11-04
Payer: MEDICARE

## 2022-11-04 VITALS — HEIGHT: 63 IN | BODY MASS INDEX: 41.05 KG/M2 | WEIGHT: 231.69 LBS

## 2022-11-04 DIAGNOSIS — Z96.1 PSEUDOPHAKIA OF RIGHT EYE: ICD-10-CM

## 2022-11-04 DIAGNOSIS — H26.491 PCO (POSTERIOR CAPSULAR OPACIFICATION), RIGHT: ICD-10-CM

## 2022-11-04 DIAGNOSIS — Z98.890 POSTOPERATIVE EYE STATE: Primary | ICD-10-CM

## 2022-11-04 PROCEDURE — 99214 OFFICE O/P EST MOD 30 MIN: CPT | Mod: PBBFAC,PO | Performed by: STUDENT IN AN ORGANIZED HEALTH CARE EDUCATION/TRAINING PROGRAM

## 2022-11-10 NOTE — PROGRESS NOTES
I have seen and examined the patient with the resident at the time of the appointment. The chart was reviewed. I agree with the assessment and plan. Care provided was reasonable and necessary. I was present for the procedure.    Cryo for AK and SK

## 2022-11-11 ENCOUNTER — OFFICE VISIT (OUTPATIENT)
Dept: OPHTHALMOLOGY | Facility: CLINIC | Age: 67
End: 2022-11-11
Payer: MEDICARE

## 2022-11-11 VITALS — WEIGHT: 231 LBS | HEIGHT: 63 IN | BODY MASS INDEX: 40.93 KG/M2

## 2022-11-11 DIAGNOSIS — Z98.890 POSTOPERATIVE EYE STATE: Primary | ICD-10-CM

## 2022-11-11 PROCEDURE — 99213 OFFICE O/P EST LOW 20 MIN: CPT | Mod: PBBFAC,PO | Performed by: STUDENT IN AN ORGANIZED HEALTH CARE EDUCATION/TRAINING PROGRAM

## 2022-11-11 NOTE — PROGRESS NOTES
Assessment /Plan     For exam results, see Encounter Report.    Postoperative eye state               1) s/p phaco/IOL OS, POD #1  - +24.50 MX60E to aim for final targeted refraction of -.58   - Doing well, wound Mayra negative, IOP controlled, pt happy with vision  - Stop the following:   - Vigamox   - Maxitrol ointment   - Eye shield   - Activity restrictions  - Taper Pred Forte weekly as follows: TID > BID > daily > stop  - Continue Acular (ketorolac) QID until empty  - Endophthalmitis and RD precautions reviewed    RTC 3 wks for POM1 MRx/DFE      2. Pseudophakia, right eye  3. PCO, right eye  - DOS: 11/9/21  - BCVA 20/25, patient happy with vision

## 2022-12-01 NOTE — PROGRESS NOTES
Faculty addendum:     I have reviewed the patients history, residents  findings on physical examination, diagnosis and treatment plan. Care provided was reasonable and necessary.     Have attempted to cosign this chart, contacted Dr. Wild.  Attempting to resolve issue.

## 2022-12-09 ENCOUNTER — OFFICE VISIT (OUTPATIENT)
Dept: GYNECOLOGY | Facility: CLINIC | Age: 67
End: 2022-12-09
Payer: MEDICARE

## 2022-12-09 VITALS
RESPIRATION RATE: 18 BRPM | HEIGHT: 63 IN | SYSTOLIC BLOOD PRESSURE: 132 MMHG | HEART RATE: 66 BPM | OXYGEN SATURATION: 99 % | TEMPERATURE: 98 F | BODY MASS INDEX: 40.2 KG/M2 | WEIGHT: 226.88 LBS | DIASTOLIC BLOOD PRESSURE: 85 MMHG

## 2022-12-09 DIAGNOSIS — Z12.4 PAP SMEAR FOR CERVICAL CANCER SCREENING: Primary | ICD-10-CM

## 2022-12-09 PROCEDURE — G0101 CA SCREEN;PELVIC/BREAST EXAM: HCPCS | Mod: S$PBB,,, | Performed by: NURSE PRACTITIONER

## 2022-12-09 PROCEDURE — G0101 PR CA SCREEN;PELVIC/BREAST EXAM: ICD-10-PCS | Mod: S$PBB,,, | Performed by: NURSE PRACTITIONER

## 2022-12-09 PROCEDURE — 88142 CYTOPATH C/V THIN LAYER: CPT | Performed by: NURSE PRACTITIONER

## 2022-12-09 PROCEDURE — 99214 OFFICE O/P EST MOD 30 MIN: CPT | Mod: PBBFAC | Performed by: NURSE PRACTITIONER

## 2022-12-09 PROCEDURE — 87624 HPV HI-RISK TYP POOLED RSLT: CPT | Performed by: NURSE PRACTITIONER

## 2022-12-09 NOTE — PROGRESS NOTES
"  Subjective:       Patient ID: Meseret Tello is a 67 y.o. female.    Chief Complaint:  Well Woman    History of Present Illness  The patient is  here for annual exam. Pt is postmenopausal since age 51. Admits history of abnormal pap 10+ years ago with no procedures and can't recall f/u. MG-21& BIRADS 2. Hx of Rt breast lumpectomy. Denies breast or urinary complaints. Denies pelvic pain, abnormal bleeding or discharge. Occ. hot flashes that are tolerable. Pt reports no STIs in the past and no concerns. Denies tobacco use. Dep. screening 0. Denies fly hx of ovarian, uterine cancer. MGM with breast cancer and father with colon cancer. Colonoscopy in past, pt and PCP shared decision of cologuard, pt states she needs to complete. DEXA 2021-osteopenia, takes vitamin D. PCP in [place].    GYN & OB History  No LMP recorded. Patient is postmenopausal.       Review of patient's allergies indicates:  No Known Allergies  Past Medical History:   Diagnosis Date    Allergy     Cataract     GERD (gastroesophageal reflux disease)      OB History    Para Term  AB Living   2 2       2   SAB IAB Ectopic Multiple Live Births           2      # Outcome Date GA Lbr Temo/2nd Weight Sex Delivery Anes PTL Lv   2 Para      Vag-Spont   KELLI   1 Para      Vag-Spont   KELLI        Review of Systems  Review of Systems    Negative except for pertinent findings for positives per HPI     Objective:    Physical Exam    /85 (BP Location: Left arm, Patient Position: Sitting, BP Method: Large (Automatic))   Pulse 66   Temp 98.4 °F (36.9 °C)   Resp 18   Ht 5' 3" (1.6 m)   Wt 102.9 kg (226 lb 13.7 oz)   SpO2 99%   BMI 40.19 kg/m²   GENERAL: Well-developed female in no acute distress.  SKIN: Normal to inspection, warm and intact.  BREASTS: No masses, lumps, discharge, tenderness.  VULVA: General appearance normal; external genitalia with no lesions or erythema.  VAGINA: Mucosa atropic, no abnormal discharge or " lesions.  CERVIX: atrophic, slightly friable, no erythema or abnormal discharge.  BIMANUAL EXAM: limited exam d/t body habitus. Juan adnexa reveal no tenderness.  PSYCHIATRIC: Patient is oriented to person, place, and time. Mood and affect are normal.    Assessment:       1. Pap smear for cervical cancer screening       Plan:   Meseret was seen today for well woman.    Diagnoses and all orders for this visit:    Pap smear for cervical cancer screening  -     Liquid-Based Pap Smear, Screening Screening  Pap today, If normal will have 2 paps on file and can proceed with routine screening.  Follow up in about 1 year (around 12/9/2023) for annual exam.

## 2022-12-15 ENCOUNTER — OFFICE VISIT (OUTPATIENT)
Dept: OPHTHALMOLOGY | Facility: CLINIC | Age: 67
End: 2022-12-15
Payer: MEDICARE

## 2022-12-15 ENCOUNTER — HOSPITAL ENCOUNTER (OUTPATIENT)
Dept: RADIOLOGY | Facility: HOSPITAL | Age: 67
Discharge: HOME | End: 2022-12-15
Attending: STUDENT IN AN ORGANIZED HEALTH CARE EDUCATION/TRAINING PROGRAM
Payer: MEDICARE

## 2022-12-15 VITALS — BODY MASS INDEX: 40.2 KG/M2 | HEIGHT: 63 IN | WEIGHT: 226.88 LBS

## 2022-12-15 DIAGNOSIS — Z12.39 ENCOUNTER FOR SCREENING FOR MALIGNANT NEOPLASM OF BREAST, UNSPECIFIED SCREENING MODALITY: ICD-10-CM

## 2022-12-15 DIAGNOSIS — Z12.31 SCREENING MAMMOGRAM FOR BREAST CANCER: ICD-10-CM

## 2022-12-15 DIAGNOSIS — Z98.890 POSTOPERATIVE EYE STATE: Primary | ICD-10-CM

## 2022-12-15 PROCEDURE — 99213 OFFICE O/P EST LOW 20 MIN: CPT | Mod: PBBFAC,PO | Performed by: OPHTHALMOLOGY

## 2022-12-15 PROCEDURE — 77067 MAMMO DIGITAL SCREENING BILAT WITH TOMO: ICD-10-PCS | Mod: 26,,, | Performed by: RADIOLOGY

## 2022-12-15 PROCEDURE — 77063 MAMMO DIGITAL SCREENING BILAT WITH TOMO: ICD-10-PCS | Mod: 26,,, | Performed by: RADIOLOGY

## 2022-12-15 PROCEDURE — 77063 BREAST TOMOSYNTHESIS BI: CPT | Mod: 26,,, | Performed by: RADIOLOGY

## 2022-12-15 PROCEDURE — 77067 SCR MAMMO BI INCL CAD: CPT | Mod: 26,,, | Performed by: RADIOLOGY

## 2022-12-15 PROCEDURE — 77063 BREAST TOMOSYNTHESIS BI: CPT | Mod: TC

## 2022-12-15 NOTE — PROGRESS NOTES
HPI     Post-op Evaluation     Additional comments: Post op month 1. s/p phaco/IOL OS. DOS: 11/03/2022.            Left eye closed every morning     Additional comments: Patient states that every morning when she wakes up   she can not open left eye. She has to open it with hand. After lifting   lids open with hand in the morning left eye behaves normally the rest of   the day.            Comments    Post op month 1 s/p CE/IOL OS DOS: 11/03/2022          Last edited by Naina Blevins MA on 12/15/2022 12:52 PM.          12/15/2022  Assessment /Plan     For exam results, see Encounter Report.    Postoperative eye state      1) s/p phaco/IOL OS, POM #1  - DOS 11/03/2022  - +24.50 MX60E to aim for final targeted refraction of -.58   - Doing well, pt happy with vision  - AC quiet off all drops  - Refracts to 20/20 -2 // 20/25 but would like to hold off on glasses at this time, provided with MRx just in case     2. Pseudophakia, right eye  3. PCO, right eye  - DOS: 11/9/21  - BCVA 20/25, patient happy with vision     RTC 6 months PCO eval OD

## 2022-12-20 LAB
HPV16+18+H RISK 12 DNA CVX-IMP: NEGATIVE
INSULIN SERPL-ACNC: NORMAL U[IU]/ML
LAB AP BETHESDA CATEGORY: NORMAL
LAB AP CLINICAL FINDINGS: NORMAL
LAB AP CONTRACEPTIVES: NORMAL
LAB AP GYN MOLECULAR TESTING: NORMAL
LAB AP LMP DATE: NORMAL
LAB AP OCHS PAP SPECIMEN ADEQUACY: NORMAL
LAB AP OHS PAP INTERPRETATION: NORMAL
LAB AP PAP DISCLAIMER COMMENTS: NORMAL
LAB AP PAP ESTROGEN REPLACEMENT THERAPY: NORMAL
LAB AP PAP PMP: NORMAL
LAB AP PAP PREVIOUS BX: NORMAL
LAB AP PAP PRIOR TREATMENT: NORMAL

## 2022-12-28 ENCOUNTER — DOCUMENTATION ONLY (OUTPATIENT)
Dept: FAMILY MEDICINE | Facility: CLINIC | Age: 67
End: 2022-12-28
Payer: MEDICARE

## 2023-01-24 ENCOUNTER — OFFICE VISIT (OUTPATIENT)
Dept: FAMILY MEDICINE | Facility: CLINIC | Age: 68
End: 2023-01-24
Payer: MEDICARE

## 2023-01-24 VITALS
BODY MASS INDEX: 41.29 KG/M2 | HEART RATE: 85 BPM | OXYGEN SATURATION: 98 % | RESPIRATION RATE: 18 BRPM | SYSTOLIC BLOOD PRESSURE: 127 MMHG | DIASTOLIC BLOOD PRESSURE: 85 MMHG | WEIGHT: 233 LBS | HEIGHT: 63 IN | TEMPERATURE: 99 F

## 2023-01-24 DIAGNOSIS — L57.0 ACTINIC KERATOSES: Primary | ICD-10-CM

## 2023-01-24 DIAGNOSIS — L82.1 SEBORRHEIC KERATOSES: ICD-10-CM

## 2023-01-24 PROCEDURE — 99214 OFFICE O/P EST MOD 30 MIN: CPT | Mod: PBBFAC

## 2023-01-24 PROCEDURE — 17000 DESTRUCT PREMALG LESION: CPT | Mod: PBBFAC | Performed by: STUDENT IN AN ORGANIZED HEALTH CARE EDUCATION/TRAINING PROGRAM

## 2023-01-24 PROCEDURE — 17003 DESTRUCT PREMALG LES 2-14: CPT | Mod: PBBFAC | Performed by: STUDENT IN AN ORGANIZED HEALTH CARE EDUCATION/TRAINING PROGRAM

## 2023-01-24 NOTE — PROGRESS NOTES
[facility] Minor Surgery Clinic Note    Subjective:     Patient ID: Meseret Tello is a 67 y.o. female    Chief Complaint: Follow up for skin lesions    HPI  68 yo F presents for evaluation of nodule on her hand    Acute Concerns:  Patient seen in 05/2022 for a lesion on hand that was biopsied and determined to be an AK with concern for more severe pathology. Patient was supposed to be seen with plastic surgery but was never contacted. She was seen again in [facility] MS clinic 10/2022 where she had an SK and an AK treated with cryotherapy. Today she reports no issues or concerns with the area on the hand where the lesion was removed, she also does not report any other suspicious lesions that she is aware of on her skin. SK on leg has not disappeared but is not causing any irritation right now.       Review of Systems  As per [facility]    Objective:     Vitals:    01/24/23 1238   BP: 127/85   Pulse: 85   Resp: 18   Temp: 98.6 °F (37 °C)       Physical Exam    General: Well developed, no acute distress  Skin: warm, dry, pink, diffuse senile purpura on bilateral forearms, 3 mini AK lesions on R forearm, 1 mini AK lesion on L forearm    Assessment:     Problem List Items Addressed This Visit    None  Visit Diagnoses       Actinic keratoses    -  Primary    Seborrheic keratoses                Plan:       Actinic Keratoses  - Cryotherapy in office today  - Advised patient to monitor area on hand where lesion was removed, if any changes occur RTC for further evaluation, at this time lesion well healed with no area of suspicion      RTC PRN for skin evaluations    Claribel Tinajero MD  [facility]  PGY-3

## 2023-01-24 NOTE — PROCEDURES
"Destruction, Premalignant Lesion    Date/Time: 1/24/2023 12:30 PM  Performed by: Claribel Tinajero MD  Authorized by: oLry Hamm MD     Consent Done?:  Yes (Written)  Time out: Immediately prior to procedure a "time out" was called to verify the correct patient, procedure, equipment, support staff and site/side marked as required.      Indications:  Actinic keratosis    Location(s):    Upper Extremity:  Arm        Detail:  left lower arm and right lower arm    Patient position: Sitting.  Number of lesions:  4  Destruction method:  Cryotherapy  Bleeding:  None   Patient tolerated the procedure well with no immediate complications.   Post-operative instructions were provided for the patient.    Claribel Tinajero MD  [facility]  PGY-3    "

## 2023-02-18 ENCOUNTER — HOSPITAL ENCOUNTER (EMERGENCY)
Facility: HOSPITAL | Age: 68
Discharge: HOME | End: 2023-02-18
Attending: EMERGENCY MEDICINE
Payer: MEDICARE

## 2023-02-18 VITALS
RESPIRATION RATE: 17 BRPM | OXYGEN SATURATION: 98 % | DIASTOLIC BLOOD PRESSURE: 97 MMHG | SYSTOLIC BLOOD PRESSURE: 159 MMHG | TEMPERATURE: 99 F | HEART RATE: 76 BPM

## 2023-02-18 DIAGNOSIS — R05.9 COUGH: ICD-10-CM

## 2023-02-18 DIAGNOSIS — J18.9 PNEUMONIA OF LEFT LOWER LOBE DUE TO INFECTIOUS ORGANISM: Primary | ICD-10-CM

## 2023-02-18 DIAGNOSIS — I44.7 LEFT BUNDLE BRANCH BLOCK: ICD-10-CM

## 2023-02-18 PROCEDURE — 99283 EMERGENCY DEPT VISIT LOW MDM: CPT | Mod: 25

## 2023-02-18 RX ORDER — AZITHROMYCIN 250 MG/1
250 TABLET, FILM COATED ORAL DAILY
Qty: 6 TABLET | Refills: 0 | Status: SHIPPED | OUTPATIENT
Start: 2023-02-18 | End: 2023-06-10

## 2023-02-19 NOTE — ED PROVIDER NOTES
Encounter Date: 2/18/2023       History     Chief Complaint   Patient presents with    Cough     Pt c/o cough and nasal congestion and drainage x few days. She states she knows she has covid and has tested 3x with negative results.  Friend is + covid. She c/o heart racing started today. HR 75. Talking freely, skin w/d.     Anxiety     67-year-old female presents with concern for possible COVID, or other issue.  She states that about 5 days ago, after being exposed to somebody with upper respiratory infection, she began to develop low-grade fevers of about 99 which lasted until yesterday, along with cough and congestion, which continues.  She states that earlier today she was sitting, thinking of a friend that was very sick, and began to feel like her heart was racing.  She became anxious about this, so decided to come to the ER.  She states it was likely just anxiety, but wants to be sure.  Nonsmoker, no family history of coronary artery disease, denies any chest pain, discomfort, nausea, sob.    Review of patient's allergies indicates:  No Known Allergies  Past Medical History:   Diagnosis Date    Allergy     Cataract     GERD (gastroesophageal reflux disease)      Past Surgical History:   Procedure Laterality Date    APPENDECTOMY      CATARACT EXTRACTION Right     CATARACT EXTRACTION W/  INTRAOCULAR LENS IMPLANT Left 11/03/2022    Procedure: EXTRACTION, CATARACT, WITH IOL INSERTION;  Surgeon: Armin Blandon MD;  Location: [facility];  Service: Ophthalmology;  Laterality: Left;    EYE SURGERY Right 11/09/2021    RIGHT PHACO/IOL    SKIN CANCER EXCISION       Family History   Problem Relation Age of Onset    Breast cancer Maternal Grandmother     Colon cancer Father     Heart failure Mother     No Known Problems Brother     No Known Problems Sister      Social History     Tobacco Use    Smoking status: Former     Packs/day: 0.25     Years: 3.00     Pack years: 0.75     Types: Cigarettes    Smokeless tobacco: Never     Tobacco comments:     Smoked 2 years as reanager   Substance Use Topics    Alcohol use: Yes     Alcohol/week: 1.0 standard drink     Types: 1 Drinks containing 0.5 oz of alcohol per week     Comment: Once a year    Drug use: Never     Review of Systems   Constitutional:  Negative for chills and fever.   HENT:  Negative for congestion, rhinorrhea and sore throat.    Respiratory:  Positive for cough. Negative for chest tightness, shortness of breath and wheezing.    Cardiovascular:  Negative for chest pain, palpitations and leg swelling.   Gastrointestinal:  Negative for abdominal pain, blood in stool, diarrhea and vomiting.   Endocrine: Negative for polyuria.   Genitourinary:  Negative for dysuria and flank pain.   Musculoskeletal:  Negative for myalgias.   Skin:  Negative for rash.   Neurological:  Negative for headaches.   All other systems reviewed and are negative.    Physical Exam     Initial Vitals [02/18/23 2105]   BP Pulse Resp Temp SpO2   (!) 159/97 76 17 99.3 °F (37.4 °C) 98 %      MAP       --         Physical Exam    Nursing note and vitals reviewed.  Constitutional: She appears well-developed and well-nourished.   HENT:   Head: Normocephalic and atraumatic.   Eyes: Conjunctivae are normal. Pupils are equal, round, and reactive to light.   Neck: Neck supple.   Normal range of motion.  Cardiovascular:  Normal rate, regular rhythm, normal heart sounds and intact distal pulses.           Pulmonary/Chest: She has rhonchi.   Mild rhonchi diffusely   Abdominal: Abdomen is soft. Bowel sounds are normal. There is no abdominal tenderness.   Musculoskeletal:         General: No tenderness or edema. Normal range of motion.      Cervical back: Normal range of motion and neck supple.      Comments: Bilateral calves are symmetric and appearance with no significant skin abnormality; normal by palpation with no tenderness or palpable abnormality.     Neurological: She is alert and oriented to person, place, and time.    Skin: Skin is warm and dry.   Psychiatric: She has a normal mood and affect.       ED Course   Procedures  Labs Reviewed - No data to display       Imaging Results              X-Ray Chest PA And Lateral (Final result)  Result time 02/18/23 21:56:53      Final result by Terrence Drew MD (02/18/23 21:56:53)                   Impression:      No acute disease is seen      Electronically signed by: Terrence Drew MD  Date:    02/18/2023  Time:    21:56               Narrative:    EXAMINATION:  XR CHEST PA AND LATERAL    CLINICAL HISTORY:  Cough, unspecified    TECHNIQUE:  PA and lateral views of the chest were performed.    COMPARISON:  05/21/2021    FINDINGS:  No acute infiltrates are seen.  Heart size is within normal limits.  Costophrenic angles are clear.  There is vascular calcification noted.                                       Medications - No data to display  Medical Decision Making:   Initial Assessment:   Differential diagnosis includes, but is not limited to viral upper respiratory infection, pneumonia, congestive heart failure, pulmonary edema, bronchospasm, pulmonary embolus.    Doubt pulmonary embolus based on patient's lack of risk factors, normal vital signs, and no shortness of breath.  Risk to benefit does not seem to be in favor of pursuing with D-dimer testing.    It is noted that patient's EKG does show a left bundle-branch block with no sign of ischemia, appropriate discordance.  Discussed with patient.  She states she may have had an EKG in the past, but it would have been over 10 years ago.  She denies any family history of coronary artery disease.  Based on patient's history and lack of risk factors, left bundle-branch block is likely old.    Based on patient's 5 days of cough, congestion, low-grade fever, feeling of heart racing today, lung sounds of rhonchi, and ongoing low-grade temp elevation of 9.3 here, we will get chest x-ray to help rule out pneumonia, and will also help rule out  any possible CHF.    PA and lateral chest x-ray interpreted by me reveals normal diaphragms, normal cardiac silhouette, left lower lung field atelectasis versus infiltrate with possible small effusion on the left.    Considering patient's 5 days of fever, cough, congestion, and chest x-ray showing possible infiltrate, will treat for likely early, developing community-acquired pneumonia with Zithromax.  Discussed with patient also importance to follow-up with PCP for recheck EKG and chest x-ray.  Voices understanding.                          Clinical Impression:   Final diagnoses:  [R05.9] Cough  [J18.9] Pneumonia of left lower lobe due to infectious organism (Primary)  [I44.7] Left bundle branch block        ED Disposition Condition    Discharge Stable          ED Prescriptions       Medication Sig Dispense Start Date End Date Auth. Provider    azithromycin (Z-LALO) 250 MG tablet Take 1 tablet (250 mg total) by mouth once daily. Take first 2 tablets together, then 1 every day until finished. 6 tablet 2/18/2023 -- Ozzy Naylor MD          Follow-up Information    None          Ozzy Naylor MD  02/18/23 2192

## 2023-06-10 ENCOUNTER — OFFICE VISIT (OUTPATIENT)
Dept: INTERNAL MEDICINE | Facility: CLINIC | Age: 68
End: 2023-06-10
Payer: MEDICARE

## 2023-06-10 VITALS
BODY MASS INDEX: 41.71 KG/M2 | SYSTOLIC BLOOD PRESSURE: 129 MMHG | WEIGHT: 226.63 LBS | RESPIRATION RATE: 18 BRPM | HEART RATE: 58 BPM | DIASTOLIC BLOOD PRESSURE: 83 MMHG | TEMPERATURE: 98 F | HEIGHT: 62 IN | OXYGEN SATURATION: 99 %

## 2023-06-10 DIAGNOSIS — K21.9 GASTROESOPHAGEAL REFLUX DISEASE WITHOUT ESOPHAGITIS: ICD-10-CM

## 2023-06-10 DIAGNOSIS — J30.2 SEASONAL ALLERGIES: ICD-10-CM

## 2023-06-10 DIAGNOSIS — Z12.11 SCREEN FOR COLON CANCER: ICD-10-CM

## 2023-06-10 DIAGNOSIS — Z00.00 MEDICARE ANNUAL WELLNESS VISIT, INITIAL: Primary | ICD-10-CM

## 2023-06-10 PROCEDURE — 99214 OFFICE O/P EST MOD 30 MIN: CPT | Mod: PBBFAC

## 2023-06-10 RX ORDER — OMEPRAZOLE 20 MG/1
20 CAPSULE, DELAYED RELEASE ORAL DAILY
Qty: 90 CAPSULE | Refills: 3 | Status: SHIPPED | OUTPATIENT
Start: 2023-06-10

## 2023-06-10 RX ORDER — DOCUSATE SODIUM 100 MG/1
100 CAPSULE, LIQUID FILLED ORAL DAILY
COMMUNITY

## 2023-06-10 RX ORDER — CETIRIZINE HYDROCHLORIDE 10 MG/1
10 TABLET ORAL DAILY
Qty: 90 TABLET | Refills: 3 | Status: SHIPPED | OUTPATIENT
Start: 2023-06-10

## 2023-06-10 NOTE — PROGRESS NOTES
"    Meseret Tello presented for a follow-up Medicare AWV today. The following components were reviewed and updated:    Medical history  Family History  Social history  Allergies and Current Medications  Health Risk Assessment  Health Maintenance  Care Team    **See Completed Assessments for Annual Wellness visit with in the encounter summary    The following assessments were completed:  Depression Screening  Cognitive function Screening  Timed Get Up Test  Whisper Test    Vitals:    06/10/23 0859   BP: 129/83   BP Location: Left arm   Patient Position: Sitting   BP Method: Large (Automatic)   Pulse: (!) 58   Resp: 18   Temp: 97.9 °F (36.6 °C)   TempSrc: Oral   SpO2: 99%   Weight: 102.8 kg (226 lb 10.1 oz)   Height: 5' 2" (1.575 m)     Body mass index is 41.45 kg/m².   ]        Diagnoses and health risks identified today and associated recommendations/orders:  1. Medicare annual wellness visit    - Cologuard Screening (Multitarget Stool DNA); Future  - Cologuard Screening (Multitarget Stool DNA)  - Declines vaccines today    2. Screen for colon cancer    - Cologuard Screening (Multitarget Stool DNA); Future  - Cologuard Screening (Multitarget Stool DNA)        Provided Meseret with a 5-10 year written screening schedule and personal prevention plan. Recommendations were developed using the USPSTF age appropriate recommendations. Education, counseling, and referrals were provided as needed.  After Visit Summary printed and given to patient which includes a list of additional screenings\tests needed.    F/U with new PCP as previously scheduled.    Follow up in about 1 year (around 6/10/2024) for Annual Wellness.      Edmond Wild,     "

## 2023-06-21 ENCOUNTER — PATIENT MESSAGE (OUTPATIENT)
Dept: ADMINISTRATIVE | Facility: HOSPITAL | Age: 68
End: 2023-06-21
Payer: MEDICARE

## 2023-06-27 LAB — NONINV COLON CA DNA+OCC BLD SCRN STL QL: NEGATIVE

## 2023-07-21 ENCOUNTER — OFFICE VISIT (OUTPATIENT)
Dept: OPHTHALMOLOGY | Facility: CLINIC | Age: 68
End: 2023-07-21
Payer: MEDICARE

## 2023-07-21 VITALS — BODY MASS INDEX: 41.71 KG/M2 | HEIGHT: 62 IN | WEIGHT: 226.63 LBS

## 2023-07-21 DIAGNOSIS — H26.491 PCO (POSTERIOR CAPSULAR OPACIFICATION), RIGHT: Primary | ICD-10-CM

## 2023-07-21 DIAGNOSIS — Z96.1 PSEUDOPHAKIA OF BOTH EYES: ICD-10-CM

## 2023-07-21 PROCEDURE — 99213 OFFICE O/P EST LOW 20 MIN: CPT | Mod: PBBFAC,PO | Performed by: STUDENT IN AN ORGANIZED HEALTH CARE EDUCATION/TRAINING PROGRAM

## 2023-07-21 NOTE — PROGRESS NOTES
HPI     Dry Eye     Additional comments: Patient states that eyes are dry, she is using OTC   AFT PRN           Comments    PCO eval OD          Last edited by Naina Blevins MA on 7/21/2023  9:44 AM.        7/21/23  Assessment /Plan     For exam results, see Encounter Report.    PCO (posterior capsular opacification), right    Pseudophakia of both eyes        PSK OU  - DOS OS 11/03/2022  - DOS OD 11/9/21  - Mrx provided 7/21/23 BCVA 20/20 // 20/25     2. PCO, right eye  - BCVA 20/20   - Monitor      RTC 1 year DFE

## 2023-09-05 ENCOUNTER — OFFICE VISIT (OUTPATIENT)
Dept: INTERNAL MEDICINE | Facility: CLINIC | Age: 68
End: 2023-09-05
Payer: MEDICARE

## 2023-09-05 VITALS
RESPIRATION RATE: 16 BRPM | TEMPERATURE: 98 F | BODY MASS INDEX: 41.71 KG/M2 | WEIGHT: 226.63 LBS | SYSTOLIC BLOOD PRESSURE: 135 MMHG | HEIGHT: 62 IN | OXYGEN SATURATION: 98 % | HEART RATE: 67 BPM | DIASTOLIC BLOOD PRESSURE: 84 MMHG

## 2023-09-05 DIAGNOSIS — E66.9 OBESITY, UNSPECIFIED CLASSIFICATION, UNSPECIFIED OBESITY TYPE, UNSPECIFIED WHETHER SERIOUS COMORBIDITY PRESENT: ICD-10-CM

## 2023-09-05 DIAGNOSIS — J30.2 SEASONAL ALLERGIES: ICD-10-CM

## 2023-09-05 DIAGNOSIS — Z00.00 ROUTINE ADULT HEALTH MAINTENANCE: ICD-10-CM

## 2023-09-05 DIAGNOSIS — Z79.4 OTHER SPECIFIED DIABETES MELLITUS WITHOUT COMPLICATION, WITH LONG-TERM CURRENT USE OF INSULIN: ICD-10-CM

## 2023-09-05 DIAGNOSIS — E13.9 OTHER SPECIFIED DIABETES MELLITUS WITHOUT COMPLICATION, WITH LONG-TERM CURRENT USE OF INSULIN: ICD-10-CM

## 2023-09-05 DIAGNOSIS — E55.9 VITAMIN D DEFICIENCY, UNSPECIFIED: ICD-10-CM

## 2023-09-05 DIAGNOSIS — Z13.1 SCREENING FOR DIABETES MELLITUS: ICD-10-CM

## 2023-09-05 DIAGNOSIS — K21.9 GASTROESOPHAGEAL REFLUX DISEASE, UNSPECIFIED WHETHER ESOPHAGITIS PRESENT: Primary | ICD-10-CM

## 2023-09-05 DIAGNOSIS — Z00.00 MEDICARE ANNUAL WELLNESS VISIT, INITIAL: ICD-10-CM

## 2023-09-05 LAB — HBA1C MFR BLD: 5.4 %

## 2023-09-05 PROCEDURE — 83036 HEMOGLOBIN GLYCOSYLATED A1C: CPT | Mod: PBBFAC

## 2023-09-05 PROCEDURE — 99215 OFFICE O/P EST HI 40 MIN: CPT | Mod: PBBFAC

## 2023-09-05 NOTE — PROGRESS NOTES
[facility]  CLINIC NOTE    Patient Name: Meseret Tello  YOB: 1955  Chief Complaint: Follow up  PRESENTING HISTORY       History of Present Illness:  Patient is a 68 y.o. female with a pmhx of  GERD, basal cell carcinoma (s/p excision 6/15) presents to clinic for routine follow up.  Last seen in clinic on 9/6/2022.  Patient has no complaints today and is compliant with medications.  Patient has a history of Basal cell carcinoma and actinic keratosis, follows with [facility] minor procedure clinic for skin tags.  Had cryotherapy for Actinic Keratosis on 01/24/2023.  POC A1C today 5.49.            Constitutional: no fever/chills  EENT: no sore throat, ear pain, sinus pain/congestion, nasal congestion/drainage  Respiratory: no cough, no wheezing, no shortness of breath  Cardiovascular: no chest pain, no palpitations, no edema  Gastrointestinal: no nausea, vomiting, or diarrhea. No abdominal pain  Genitourinary: no dysuria, no urinary frequency or urgency, no hematuria  Integumentary: no skin rash or abnormal lesion  Neurologic: no headache, no dizziness, no weakness or numbness    PAST HISTORY:     Past Medical History:   Diagnosis Date    Allergy     Cataract     GERD (gastroesophageal reflux disease)        Past Surgical History:   Procedure Laterality Date    APPENDECTOMY      CATARACT EXTRACTION Right     CATARACT EXTRACTION W/  INTRAOCULAR LENS IMPLANT Left 11/03/2022    Procedure: EXTRACTION, CATARACT, WITH IOL INSERTION;  Surgeon: Armin Blandon MD;  Location: [facility];  Service: Ophthalmology;  Laterality: Left;    EYE SURGERY Right 11/09/2021    RIGHT PHACO/IOL    SKIN CANCER EXCISION         Family History   Problem Relation Age of Onset    Breast cancer Maternal Grandmother     Colon cancer Father     Heart failure Mother     No Known Problems Brother     No Known Problems Sister        Social History     Socioeconomic History    Marital status:      Spouse name:  MIGUEL    Number of children: 2    Years of education: 12   Tobacco Use    Smoking status: Former     Current packs/day: 0.25     Average packs/day: 0.3 packs/day for 3.0 years (0.8 ttl pk-yrs)     Types: Cigarettes    Smokeless tobacco: Never    Tobacco comments:     Smoked 2 years as reanager   Substance and Sexual Activity    Alcohol use: Yes     Alcohol/week: 1.0 standard drink of alcohol     Types: 1 Drinks containing 0.5 oz of alcohol per week     Comment: Once a year    Drug use: Never    Sexual activity: Not Currently     Birth control/protection: None     Social Determinants of Health     Financial Resource Strain: Low Risk  (9/6/2022)    Overall Financial Resource Strain (CARDIA)     Difficulty of Paying Living Expenses: Not very hard   Food Insecurity: No Food Insecurity (9/6/2022)    Hunger Vital Sign     Worried About Running Out of Food in the Last Year: Never true     Ran Out of Food in the Last Year: Never true   Transportation Needs: No Transportation Needs (9/6/2022)    PRAPARE - Transportation     Lack of Transportation (Medical): No     Lack of Transportation (Non-Medical): No   Physical Activity: Insufficiently Active (9/6/2022)    Exercise Vital Sign     Days of Exercise per Week: 3 days     Minutes of Exercise per Session: 30 min   Stress: No Stress Concern Present (9/6/2022)    [employer] [place] [employer] - Occupational Stress Questionnaire     Feeling of Stress : Not at all   Social Connections: Socially Integrated (9/6/2022)    Social Connection and Isolation Panel [NHANES]     Frequency of Communication with Friends and Family: Three times a week     Frequency of Social Gatherings with Friends and Family: Once a week     Attends Zoroastrianism Services: 1 to 4 times per year     Active Member of Clubs or Organizations: Yes     Attends Club or Organization Meetings: 1 to 4 times per year     Marital Status:    Housing Stability: Low Risk  (9/6/2022)    Housing Stability Vital  "Sign     Unable to Pay for Housing in the Last Year: No     Number of Places Lived in the Last Year: 1     Unstable Housing in the Last Year: No       MEDICATIONS & ALLERGIES:     Current Outpatient Medications on File Prior to Visit   Medication Sig    albuterol (PROVENTIL) 2.5 mg /3 mL (0.083 %) nebulizer solution Inhale 2.5 mg into the lungs.    aspirin (ECOTRIN) 81 MG EC tablet Take 81 mg by mouth once daily.    cetirizine (ZYRTEC) 10 MG tablet Take 1 tablet (10 mg total) by mouth once daily.    docusate sodium (COLACE) 100 MG capsule Take 100 mg by mouth once daily.    multivitamin with minerals tablet Take 1 tablet by mouth once daily.    omeprazole (PRILOSEC) 20 MG capsule Take 1 capsule (20 mg total) by mouth once daily.    tiZANidine (ZANAFLEX) 4 MG tablet Take 4 mg by mouth.    vitamin D (VITAMIN D3) 1000 units Tab Take 1,000 Units by mouth once daily.     No current facility-administered medications on file prior to visit.       Review of patient's allergies indicates:  No Known Allergies    OBJECTIVE:   Vital Signs:  Vitals:    09/05/23 0821 09/05/23 0831   BP: (!) 145/82 135/84   Pulse: 67    Resp: 16    Temp: 98.4 °F (36.9 °C)    SpO2: 98%    Weight: 102.8 kg (226 lb 9.6 oz)    Height: 5' 2" (1.575 m)        Recent Results (from the past 24 hour(s))   POCT HEMOGLOBIN A1C    Collection Time: 09/05/23  9:42 AM   Result Value Ref Range    Hemoglobin A1C, POC 5.4 %   Lipid Panel    Collection Time: 09/05/23 10:04 AM   Result Value Ref Range    Cholesterol Total 227 (H) <=200 mg/dL    HDL Cholesterol 51 35 - 60 mg/dL    Triglyceride 105 37 - 140 mg/dL    Cholesterol/HDL Ratio 4 0 - 5    Very Low Density Lipoprotein 21     LDL Cholesterol 155.00 (H) 50.00 - 140.00 mg/dL   Comprehensive Metabolic Panel    Collection Time: 09/05/23 10:04 AM   Result Value Ref Range    Sodium Level 143 136 - 145 mmol/L    Potassium Level 4.4 3.5 - 5.1 mmol/L    Chloride 107 98 - 107 mmol/L    Carbon Dioxide 28 23 - 31 mmol/L    " "Glucose Level 89 82 - 115 mg/dL    Blood Urea Nitrogen 19.4 9.8 - 20.1 mg/dL    Creatinine 0.84 0.55 - 1.02 mg/dL    Calcium Level Total 10.2 8.4 - 10.2 mg/dL    Protein Total 7.8 (H) 5.8 - 7.6 gm/dL    Albumin Level 3.9 3.4 - 4.8 g/dL    Globulin 3.9 (H) 2.4 - 3.5 gm/dL    Albumin/Globulin Ratio 1.0 (L) 1.1 - 2.0 ratio    Bilirubin Total 0.4 <=1.5 mg/dL    Alkaline Phosphatase 75 40 - 150 unit/L    Alanine Aminotransferase 20 0 - 55 unit/L    Aspartate Aminotransferase 29 5 - 34 unit/L    eGFR >60 mls/min/1.73/m2   Vitamin D    Collection Time: 09/05/23 10:04 AM   Result Value Ref Range    Vit D 25 .9 (H) 30.0 - 80.0 ng/mL         General: well-developed, well-nourished, no acute distress  Eye: PERRLA, EOMI, clear conjunctiva, eyelids normal  HENT: Head - normocephalic and atraumatic,  nasal  and oral mucosa moist and clear  Neck: full range of motion, no thyromegaly or lymphadenopathy, trachea midline, supple  Respiratory: clear to auscultation bilaterally without wheezes, rales, rhonchi  Cardiovascular: regular rate and rhythm without murmurs, gallops or rubs, normal S1 S2. No JVD. Capillary refill within normal limits.  Gastrointestinal: soft, non-tender, non-distended with normal bowel sounds in all four quadrants. No masses palpated  Musculoskeletal: full range of motion of all extremities without limitation or discomfort  Integumentary: no rashes or skin lesions present, no erythema  Neurologic: AAO x 3, Cranial nerves II-XII intact, no signs of peripheral neurological deficit, motor/sensory function intact, no dysarthria.  Psychiatric: cooperative with exam, good eye contact, no hallucinations.      Laboratory  Lab Results   Component Value Date    WBC 6.8 09/06/2022    HGB 14.6 09/06/2022    HCT 43.2 09/06/2022    MCV 91.5 09/06/2022     09/06/2022     @[id number](GLU,NA,K,Cl,CO2,BUN,Creatinine,Calcium,MG)@  No results found for: "INR", "PROTIME"  Lab Results   Component Value Date    HGBA1C " "5.4 03/06/2018     No results for input(s): "POCTGLUCOSE" in the last 72 hours.      ASSESSMENT & PLAN:     Hyperlipidemia  -Patient completed lipid panel today but did not fast this morning.  -Will repeat labs at next visit, instructed patient to fast.      GERD  -Continue omeprazole, patient states that reflux has been well controlled.   -Patient still reports taking as needed, previous discussions in regards to possible repeat EGD in the future should her symptoms ever worsen    History of right temporal basal cell carcinoma  Actinic Keratosis  -(status post excision 6/15)  -Sees [facility] minor procedure clinic, had cryotherapy 1/24/2023 for actinic keratosis.  -Patient states that she cancelled her last appointment becauseit was scheduled 3 months apart and she lives far.    -Discussed with patient importance of follow with dermatology clinic, patient voiced understanding and will reschedule appointment.      Allergic rhinitis  -patient takes Claritin daily  -Allergies well controlled.         Health Maintenance   Pneumococcal vaccine:   TDAP: 6/2016  Influenza vaccine:   Shingrix vaccine:   Cervical Cancer: pap 12/2022 negative  MMG: last mammogram 12/2022  Screening colonoscopy:  cologuard 6/2023 negative  Hepatitis Panel: completed  COVID-19:         RTC in 3 months.       Labs today: A1C, lipid panel, cmp        Thee Maldonado MD  Internal Medicine PGY-1             "

## 2023-09-06 NOTE — PROGRESS NOTES
Attending Addendum:   Patient seen and examined in clinic. Management and Plan were discussed with resident. Care was reasonable and necessary.   Katy Hamilton MD  [facility] - Internal Medicine

## 2023-10-12 DIAGNOSIS — K21.9 GASTROESOPHAGEAL REFLUX DISEASE WITHOUT ESOPHAGITIS: ICD-10-CM

## 2023-10-12 RX ORDER — OMEPRAZOLE 20 MG/1
20 CAPSULE, DELAYED RELEASE ORAL DAILY
Qty: 90 CAPSULE | Refills: 3 | OUTPATIENT
Start: 2023-10-12

## 2023-10-12 NOTE — TELEPHONE ENCOUNTER
Pt called, name and  verified. Pt informed that [facility] pharmacy was called and spoke with Ann and she reported that she will fill the omeprazole and call the pt to pick it up. Pt verbalized 100% understanding. No further questions or concerns noted. Call ended.

## 2023-12-13 ENCOUNTER — OFFICE VISIT (OUTPATIENT)
Dept: GYNECOLOGY | Facility: CLINIC | Age: 68
End: 2023-12-13
Payer: MEDICARE

## 2023-12-13 VITALS
OXYGEN SATURATION: 100 % | HEART RATE: 63 BPM | HEIGHT: 62 IN | TEMPERATURE: 98 F | BODY MASS INDEX: 41.77 KG/M2 | SYSTOLIC BLOOD PRESSURE: 131 MMHG | DIASTOLIC BLOOD PRESSURE: 82 MMHG | WEIGHT: 227 LBS | RESPIRATION RATE: 20 BRPM

## 2023-12-13 DIAGNOSIS — Z12.31 ENCOUNTER FOR SCREENING MAMMOGRAM FOR BREAST CANCER: ICD-10-CM

## 2023-12-13 DIAGNOSIS — Z01.419 ENCOUNTER FOR ANNUAL ROUTINE GYNECOLOGICAL EXAMINATION: Primary | ICD-10-CM

## 2023-12-13 PROCEDURE — G0101 PR CA SCREEN;PELVIC/BREAST EXAM: ICD-10-PCS | Mod: S$PBB,,, | Performed by: NURSE PRACTITIONER

## 2023-12-13 PROCEDURE — G0101 CA SCREEN;PELVIC/BREAST EXAM: HCPCS | Mod: S$PBB,,, | Performed by: NURSE PRACTITIONER

## 2023-12-13 PROCEDURE — G0101 CA SCREEN;PELVIC/BREAST EXAM: HCPCS | Mod: PBBFAC | Performed by: NURSE PRACTITIONER

## 2023-12-13 PROCEDURE — 99214 OFFICE O/P EST MOD 30 MIN: CPT | Mod: PBBFAC,25 | Performed by: NURSE PRACTITIONER

## 2023-12-13 NOTE — PROGRESS NOTES
"  Subjective:       Patient ID: Meseret Tello is a 68 y.o. female.    Chief Complaint:  Gynecologic Exam      History of Present Illness  The patient is  here for annual exam. Pt is postmenopausal since age 51. Admits history of abnormal pap 10+ years ago with no procedures and can't recall f/u. Pap in  both were NIL and HPV neg. MG-12/15/22 & BIRADS 2. Hx of Rt breast lumpectomy. Denies breast or urinary complaints. Denies pelvic pain, abnormal bleeding or discharge. Occ. hot flashes that are tolerable. Pt reports no STIs in the past and no concerns. Denies tobacco use. Dep. screening 0. Denies fly hx of ovarian, uterine cancer. MGM with breast cancer and father with colon cancer. Colonoscopy in past, cologuard-, neg. DEXA 2021-osteopenia, takes vitamin D. PCP in [place].     GYN & OB History  No LMP recorded. Patient is postmenopausal.   Date of Last Pap: 2022    Review of patient's allergies indicates:  No Known Allergies  Past Medical History:   Diagnosis Date    Abnormal Pap smear of cervix     Allergy     Cataract     GERD (gastroesophageal reflux disease)     Hyperlipidemia      OB History    Para Term  AB Living   2 2       2   SAB IAB Ectopic Multiple Live Births           2      # Outcome Date GA Lbr Temo/2nd Weight Sex Delivery Anes PTL Lv   2 Para      Vag-Spont   KELLI   1 Para      Vag-Spont   KELLI        Review of Systems  Review of Systems    Negative except for pertinent findings for positives per HPI     Objective:    Physical Exam    /82 (BP Location: Left arm, Patient Position: Sitting, BP Method: Medium (Automatic))   Pulse 63   Temp 97.9 °F (36.6 °C) (Oral)   Resp 20   Ht 5' 2" (1.575 m)   Wt 103 kg (227 lb)   SpO2 100%   BMI 41.52 kg/m²   GENERAL: Well-developed female. No acute distress.    SKIN: Normal to inspection, warm and intact.  BREASTS: No rashes or erythema. No masses, lumps, discharge, tenderness.  VULVA: General appearance normal; " external genitalia with no lesions or erythema.  VAGINA: Mucosa/vaginal vault atrophic, no abnormal discharge or lesions.  CERVIX: Atrophic, parous appearing os, no erythema or abnormal discharge.  BIMANUAL EXAM: limited exam d/t body habitus. The uterus is non tender. Juan adnexa reveal no tenderness.  PSYCHIATRIC: Patient is oriented to person, place, and time. Mood and affect are normal.    Assessment:       1. Encounter for annual routine gynecological examination    2. Encounter for screening mammogram for breast cancer       Plan:   Meseret was seen today for gynecologic exam.    Diagnoses and all orders for this visit:    Encounter for annual routine gynecological examination    Encounter for screening mammogram for breast cancer  -     Mammo Digital Screening Bilat w/ Wilmer; Future    Pelvic exam, pap utd per ACOG  MG ordered  Pt is post menopausal, report any bleeding which would be abnormal  Follow up in about 1 year (around 12/13/2024) for annual exam.

## 2024-01-02 ENCOUNTER — HOSPITAL ENCOUNTER (OUTPATIENT)
Dept: RADIOLOGY | Facility: HOSPITAL | Age: 69
Discharge: HOME | End: 2024-01-02
Attending: NURSE PRACTITIONER
Payer: MEDICARE

## 2024-01-02 DIAGNOSIS — Z12.31 ENCOUNTER FOR SCREENING MAMMOGRAM FOR BREAST CANCER: ICD-10-CM

## 2024-01-02 PROCEDURE — 77067 SCR MAMMO BI INCL CAD: CPT | Mod: TC

## 2024-01-02 PROCEDURE — 77063 BREAST TOMOSYNTHESIS BI: CPT | Mod: 26,,, | Performed by: RADIOLOGY

## 2024-01-02 PROCEDURE — 77067 SCR MAMMO BI INCL CAD: CPT | Mod: 26,,, | Performed by: RADIOLOGY

## 2024-05-13 NOTE — PROGRESS NOTES
INTERNAL MEDICINE RESIDENT CLINIC  CLINIC NOTE    Patient Name: Meseret Tello  YOB: 1955  Chief Complaint: Follow up  PRESENTING HISTORY       History of Present Illness:  Patient is a 68 y.o. female with a pmhx of  GERD, basal cell carcinoma (s/p excision 6/15) presents to clinic for routine follow up.  Last seen in clinic 09/05/2023.  Patient has a history of Basal cell carcinoma and actinic keratosis, follows with [facility] minor procedure clinic for skin tags.  Had cryotherapy for Actinic Keratosis on 01/24/2023.  Patient previously had appointment scheduled with Dermatology although states that she was told that she needs another referral to dermatology clinic for follow up of basal cell carcinoma and actinic keratosis.  She has no other complaints at this time.  She was compliant with all of her medications.          Constitutional: no fever/chills  EENT: no sore throat, ear pain, sinus pain/congestion, nasal congestion/drainage  Respiratory: no cough, no wheezing, no shortness of breath  Cardiovascular: no chest pain, no palpitations, no edema  Gastrointestinal: no nausea, vomiting, or diarrhea. No abdominal pain  Genitourinary: no dysuria, no urinary frequency or urgency, no hematuria  Integumentary: no skin rash or abnormal lesion  Neurologic: no headache, no dizziness, no weakness or numbness    PAST HISTORY:     Past Medical History:   Diagnosis Date    Abnormal Pap smear of cervix     Allergy     Cataract     GERD (gastroesophageal reflux disease)     Hyperlipidemia        Past Surgical History:   Procedure Laterality Date    APPENDECTOMY      CATARACT EXTRACTION Right     CATARACT EXTRACTION W/  INTRAOCULAR LENS IMPLANT Left 11/03/2022    Procedure: EXTRACTION, CATARACT, WITH IOL INSERTION;  Surgeon: Armin Blandon MD;  Location: [facility];  Service: Ophthalmology;  Laterality: Left;    EYE SURGERY Right 11/09/2021    RIGHT PHACO/IOL    SKIN CANCER EXCISION         Family History    Problem Relation Name Age of Onset    Breast cancer Maternal Grandmother      Colon cancer Father Father     Heart failure Mother      No Known Problems Brother      No Known Problems Sister         Social History     Socioeconomic History    Marital status:      Spouse name: MIGUEL    Number of children: 2    Years of education: 12   Tobacco Use    Smoking status: Former     Current packs/day: 0.25     Average packs/day: 0.3 packs/day for 3.0 years (0.8 ttl pk-yrs)     Types: Cigarettes    Smokeless tobacco: Never    Tobacco comments:     Smoked 2 years as reanager   Substance and Sexual Activity    Alcohol use: Yes     Alcohol/week: 1.0 standard drink of alcohol     Types: 1 Drinks containing 0.5 oz of alcohol per week     Comment: Once a year    Drug use: Never    Sexual activity: Not Currently     Birth control/protection: None     Social Determinants of Health     Financial Resource Strain: Patient Declined (1/4/2024)    Overall Financial Resource Strain (CARDIA)     Difficulty of Paying Living Expenses: Patient declined   Food Insecurity: Patient Declined (1/4/2024)    Hunger Vital Sign     Worried About Running Out of Food in the Last Year: Patient declined     Ran Out of Food in the Last Year: Patient declined   Transportation Needs: Patient Declined (1/4/2024)    PRAPARE - Transportation     Lack of Transportation (Medical): Patient declined     Lack of Transportation (Non-Medical): Patient declined   Physical Activity: Insufficiently Active (1/4/2024)    Exercise Vital Sign     Days of Exercise per Week: 4 days     Minutes of Exercise per Session: 30 min   Stress: No Stress Concern Present (1/4/2024)    [employer] [place] [employer] - Occupational Stress Questionnaire     Feeling of Stress : Not at all   Housing Stability: Patient Declined (1/4/2024)    Housing Stability Vital Sign     Unable to Pay for Housing in the Last Year: Patient declined     Unstable Housing in the Last Year:  "Patient declined       MEDICATIONS & ALLERGIES:     Current Outpatient Medications on File Prior to Visit   Medication Sig    albuterol (PROVENTIL) 2.5 mg /3 mL (0.083 %) nebulizer solution Inhale 2.5 mg into the lungs.    aspirin (ECOTRIN) 81 MG EC tablet Take 81 mg by mouth once daily.    cetirizine (ZYRTEC) 10 MG tablet Take 1 tablet (10 mg total) by mouth once daily.    docusate sodium (COLACE) 100 MG capsule Take 100 mg by mouth once daily.    multivitamin with minerals tablet Take 1 tablet by mouth once daily.    omeprazole (PRILOSEC) 20 MG capsule Take 1 capsule (20 mg total) by mouth once daily.    tiZANidine (ZANAFLEX) 4 MG tablet Take 4 mg by mouth.    vitamin D (VITAMIN D3) 1000 units Tab Take 1,000 Units by mouth once daily.     No current facility-administered medications on file prior to visit.       Review of patient's allergies indicates:   Allergen Reactions    Sulfa (sulfonamide antibiotics) Other (See Comments)     Mother was severely allergic and pt ws told not to take it . She has never had a reaction        OBJECTIVE:   Vital Signs:  Vitals:    05/14/24 1354   BP: 134/65   Pulse: 67   Resp: 16   Temp: 98.4 °F (36.9 °C)   SpO2: 98%   Weight: 103.5 kg (228 lb 3.2 oz)   Height: 5' 2" (1.575 m)         No results found for this or any previous visit (from the past 24 hour(s)).        General: well-developed, well-nourished, no acute distress  Eye:  clear conjunctiva, eyelids normal  HENT: Head - normocephalic and atraumatic,  nasal  and oral mucosa moist and clear  Neck: full range of motion, no thyromegaly or lymphadenopathy, trachea midline, supple  Respiratory: clear to auscultation bilaterally without wheezes, rales, rhonchi  Cardiovascular: regular rate and rhythm without murmurs, gallops or rubs, normal S1 S2. No JVD. Capillary refill within normal limits.  Gastrointestinal: soft, non-tender, non-distended with normal bowel sounds in all four quadrants. No masses palpated  Musculoskeletal: " "full range of motion of all extremities without limitation or discomfort  Integumentary: no rashes or skin lesions present, no erythema  Neurologic: AAO x 3,  no dysarthria.  Psychiatric: cooperative with exam, good eye contact, no hallucinations.      Laboratory  Lab Results   Component Value Date    WBC 6.8 09/06/2022    HGB 14.6 09/06/2022    HCT 43.2 09/06/2022    MCV 91.5 09/06/2022     09/06/2022     @LIVIZTDFS08(GLU,NA,K,Cl,CO2,BUN,Creatinine,Calcium,MG)@  No results found for: "INR", "PROTIME"  Lab Results   Component Value Date    HGBA1C 5.4 03/06/2018     No results for input(s): "POCTGLUCOSE" in the last 72 hours.      ASSESSMENT & PLAN:     History of right temporal basal cell carcinoma  Actinic Keratosis  -(status post excision 6/15)  -Sees [facility] minor procedure clinic, had cryotherapy 1/24/2023 for actinic keratosis.  -Patient states that she cancelled her last appointment becauseit was scheduled 3 months apart and she lives far.    -patient had prior appointment scheduled with Dermatology Clinic although she was told that she needs a repeat referral to be seen.    -referral placed to [facility] Dermatology for follow up of basal cell carcinoma and actinic keratosis.  -prior images uploaded to media.    Vitamin D Toxicity  -patient has been taking vitamin-D 1000 daily.    -last vitamin-D level 104.9.    -discontinue vitamin-D supplementation.  Will recheck vitamin-D level at next visit.    Osteopenia  -DEXA 07/21/2021:  Osteopenia of lumbar spine.  -repeat DEXA scan ordered.      GERD  -Continue omeprazole, patient states that reflux has been well controlled.   -Patient still reports taking as needed, previous discussions in regards to possible repeat EGD in the future should her symptoms ever worsen    Allergic rhinitis  -patient takes Claritin daily  -Allergies well controlled.         Health Maintenance   Pneumococcal vaccine:   TDAP: 6/2016  Influenza vaccine:   Shingrix vaccine:   Cervical Cancer: " pap 12/2022 negative, follows with [facility] gynecology.  MMG: last mammogram 12/2022  Screening colonoscopy:  cologuard 6/2023 negative  Hepatitis Panel: completed  COVID-19: Declined        RTC in 6 months        Thee Maldonado MD  Internal Medicine PGY-1

## 2024-05-14 ENCOUNTER — OFFICE VISIT (OUTPATIENT)
Dept: INTERNAL MEDICINE | Facility: CLINIC | Age: 69
End: 2024-05-14
Payer: MEDICARE

## 2024-05-14 VITALS
WEIGHT: 228.19 LBS | HEIGHT: 62 IN | BODY MASS INDEX: 41.99 KG/M2 | DIASTOLIC BLOOD PRESSURE: 65 MMHG | HEART RATE: 67 BPM | TEMPERATURE: 98 F | RESPIRATION RATE: 16 BRPM | OXYGEN SATURATION: 98 % | SYSTOLIC BLOOD PRESSURE: 134 MMHG

## 2024-05-14 DIAGNOSIS — Z85.828 HISTORY OF BASAL CELL CARCINOMA: ICD-10-CM

## 2024-05-14 DIAGNOSIS — Z00.00 HEALTHCARE MAINTENANCE: ICD-10-CM

## 2024-05-14 DIAGNOSIS — M85.89 OTHER SPECIFIED DISORDERS OF BONE DENSITY AND STRUCTURE, MULTIPLE SITES: ICD-10-CM

## 2024-05-14 DIAGNOSIS — M85.80 OSTEOPENIA, UNSPECIFIED LOCATION: Primary | ICD-10-CM

## 2024-05-14 DIAGNOSIS — T45.2X1A POISONING BY VITAMIN D, ACCIDENTAL OR UNINTENTIONAL, INITIAL ENCOUNTER: ICD-10-CM

## 2024-05-14 PROCEDURE — 99215 OFFICE O/P EST HI 40 MIN: CPT | Mod: PBBFAC

## 2024-05-16 NOTE — PROGRESS NOTES
I have reviewed and concur with the resident's history, physical, assessment, and plan.  I have discussed with him all issues related to the diagnosis, workup and treatment plan. Care provided as reasonable and necessary.    Warren Corbett MD  [facility]

## 2024-07-08 DIAGNOSIS — K21.9 GASTROESOPHAGEAL REFLUX DISEASE WITHOUT ESOPHAGITIS: ICD-10-CM

## 2024-07-08 DIAGNOSIS — J30.2 SEASONAL ALLERGIES: ICD-10-CM

## 2024-07-09 ENCOUNTER — HOSPITAL ENCOUNTER (OUTPATIENT)
Dept: RADIOLOGY | Facility: HOSPITAL | Age: 69
Discharge: HOME | End: 2024-07-09
Payer: MEDICARE

## 2024-07-09 DIAGNOSIS — M85.80 OSTEOPENIA, UNSPECIFIED LOCATION: ICD-10-CM

## 2024-07-09 DIAGNOSIS — M85.89 OTHER SPECIFIED DISORDERS OF BONE DENSITY AND STRUCTURE, MULTIPLE SITES: ICD-10-CM

## 2024-07-09 PROCEDURE — 77080 DXA BONE DENSITY AXIAL: CPT | Mod: TC

## 2024-07-09 RX ORDER — CETIRIZINE HYDROCHLORIDE 10 MG/1
10 TABLET ORAL DAILY
Qty: 90 TABLET | Refills: 3 | Status: SHIPPED | OUTPATIENT
Start: 2024-07-09

## 2024-07-09 RX ORDER — OMEPRAZOLE 20 MG/1
20 CAPSULE, DELAYED RELEASE ORAL DAILY
Qty: 90 CAPSULE | Refills: 3 | Status: SHIPPED | OUTPATIENT
Start: 2024-07-09

## 2024-07-22 ENCOUNTER — OFFICE VISIT (OUTPATIENT)
Dept: OPHTHALMOLOGY | Facility: CLINIC | Age: 69
End: 2024-07-22
Payer: MEDICARE

## 2024-07-22 VITALS — HEIGHT: 62 IN | WEIGHT: 228.19 LBS | BODY MASS INDEX: 41.99 KG/M2

## 2024-07-22 DIAGNOSIS — H26.491 PCO (POSTERIOR CAPSULAR OPACIFICATION), RIGHT: Primary | ICD-10-CM

## 2024-07-22 DIAGNOSIS — Z96.1 PSEUDOPHAKIA OF BOTH EYES: ICD-10-CM

## 2024-07-22 PROCEDURE — 99213 OFFICE O/P EST LOW 20 MIN: CPT | Mod: PBBFAC,PN

## 2024-07-22 NOTE — PROGRESS NOTES
HPI    RTC 1 year DFE   Last edited by Latrice Puente MA on 7/22/2024  8:32 AM.            Assessment /Plan     For exam results, see Encounter Report.    There are no diagnoses linked to this encounter.    OCT MAC   07/22/2024  OD: , intact foveal contour, no IRF/SRF  OS: , intact foveal contour, no IRF/SRF      PSK OU  - DOS OS 11/03/2022  - DOS OD 11/9/21  - BCVA 20/30//2025 - will hold off on Mrx until after YAG OD     2. PCO, right eye  - BCVA 20/30 - bothered by vision. Interested in YAG OD  - Monitor      RTC next avail procedure day YAG OD (dilate OD only)

## 2024-10-21 ENCOUNTER — PATIENT MESSAGE (OUTPATIENT)
Dept: ADMINISTRATIVE | Facility: HOSPITAL | Age: 69
End: 2024-10-21
Payer: MEDICARE

## 2024-10-22 ENCOUNTER — PATIENT OUTREACH (OUTPATIENT)
Facility: CLINIC | Age: 69
End: 2024-10-22
Payer: MEDICARE

## 2024-10-22 DIAGNOSIS — Z12.31 SCREENING MAMMOGRAM FOR BREAST CANCER: Primary | ICD-10-CM

## 2024-10-22 NOTE — PROGRESS NOTES
Health Maintenance Topic(s) Outreach Outcomes & Actions Taken:    Breast Cancer Screening - Outreach Outcomes & Actions Taken  : Mammogram Order Placed     Additional Notes:

## 2024-12-23 ENCOUNTER — OFFICE VISIT (OUTPATIENT)
Dept: INTERNAL MEDICINE | Facility: CLINIC | Age: 69
End: 2024-12-23
Payer: MEDICARE

## 2024-12-23 VITALS
RESPIRATION RATE: 20 BRPM | SYSTOLIC BLOOD PRESSURE: 121 MMHG | WEIGHT: 227.81 LBS | HEIGHT: 62 IN | OXYGEN SATURATION: 97 % | BODY MASS INDEX: 41.92 KG/M2 | HEART RATE: 68 BPM | TEMPERATURE: 98 F | DIASTOLIC BLOOD PRESSURE: 78 MMHG

## 2024-12-23 DIAGNOSIS — L57.0 ACTINIC KERATOSIS: ICD-10-CM

## 2024-12-23 DIAGNOSIS — Z85.828 HISTORY OF BASAL CELL CANCER: Primary | ICD-10-CM

## 2024-12-23 PROCEDURE — 99214 OFFICE O/P EST MOD 30 MIN: CPT | Mod: PBBFAC

## 2024-12-23 NOTE — PROGRESS NOTES
INTERNAL MEDICINE RESIDENT CLINIC  CLINIC NOTE    Patient Name: Meseret Tello  YOB: 1955  Chief Complaint: Follow up  PRESENTING HISTORY       History of Present Illness:  Patient is a 69 y.o. female with a pmhx of  GERD, basal cell carcinoma (s/p excision 6/15), actinic keratosis s/p cryotherapy 1/24/23, cataract surgery presents to clinic for routine follow up.     Patient is worried about two skin lesion near her lips and left eyebrows and she is requesting dermatology referral. Skin lesions are raised and skin-colored, no overt umbilications.  Patient previously had appointment scheduled with dermatology although states that she was told that she needs another referral to dermatology clinic for follow up of basal cell carcinoma and actinic keratosis. Regarding her GERD, she stated it is relatively well controlled, she just mainly has GERD symptoms whenever she drinks alcohol which is rare.    Smoked for 3 years when she was 19 yo, 1 pack a week. Drinks 1 glass of whiskey once a year. No illicit drugs. FH of colorectal cancer father.    Constitutional: no fever/chills  EENT: no sore throat, ear pain, sinus pain/congestion, nasal congestion/drainage  Respiratory: no cough, no wheezing, no shortness of breath  Cardiovascular: no chest pain, no palpitations, no edema  Gastrointestinal: no nausea, vomiting, or diarrhea. No abdominal pain  Genitourinary: no dysuria, no urinary frequency or urgency, no hematuria  Integumentary: no skin rash or abnormal lesion  Neurologic: no headache, no dizziness, no weakness or numbness    PAST HISTORY:     Past Medical History:   Diagnosis Date    Abnormal Pap smear of cervix     Allergy     Cataract     GERD (gastroesophageal reflux disease)     Hyperlipidemia        Past Surgical History:   Procedure Laterality Date    APPENDECTOMY      CATARACT EXTRACTION Right     CATARACT EXTRACTION W/  INTRAOCULAR LENS IMPLANT Left 11/03/2022    Procedure: EXTRACTION,  CATARACT, WITH IOL INSERTION;  Surgeon: Armin Blandon MD;  Location: [facility];  Service: Ophthalmology;  Laterality: Left;    EYE SURGERY Right 11/09/2021    RIGHT PHACO/IOL    SKIN CANCER EXCISION         Family History   Problem Relation Name Age of Onset    Breast cancer Maternal Grandmother      Colon cancer Father Father     Heart failure Mother      No Known Problems Brother      No Known Problems Sister         Social History     Socioeconomic History    Marital status:      Spouse name: MIGUEL    Number of children: 2    Years of education: 12   Tobacco Use    Smoking status: Former     Current packs/day: 0.25     Average packs/day: 0.3 packs/day for 3.0 years (0.8 ttl pk-yrs)     Types: Cigarettes    Smokeless tobacco: Never    Tobacco comments:     Smoked 2 years as teenager   Substance and Sexual Activity    Alcohol use: Not Currently     Alcohol/week: 1.0 standard drink of alcohol     Types: 1 Drinks containing 0.5 oz of alcohol per week     Comment: Once a year    Drug use: Never    Sexual activity: Not Currently     Birth control/protection: None     Social Drivers of Health     Financial Resource Strain: Patient Declined (1/4/2024)    Overall Financial Resource Strain (CARDIA)     Difficulty of Paying Living Expenses: Patient declined   Food Insecurity: Patient Declined (1/4/2024)    Hunger Vital Sign     Worried About Running Out of Food in the Last Year: Patient declined     Ran Out of Food in the Last Year: Patient declined   Transportation Needs: Patient Declined (1/4/2024)    PRAPARE - Transportation     Lack of Transportation (Medical): Patient declined     Lack of Transportation (Non-Medical): Patient declined   Physical Activity: Insufficiently Active (1/4/2024)    Exercise Vital Sign     Days of Exercise per Week: 4 days     Minutes of Exercise per Session: 30 min   Stress: No Stress Concern Present (1/4/2024)    [employer] - Occupational Stress  "Questionnaire     Feeling of Stress : Not at all   Housing Stability: Patient Declined (1/4/2024)    Housing Stability Vital Sign     Unable to Pay for Housing in the Last Year: Patient declined     Unstable Housing in the Last Year: Patient declined       MEDICATIONS & ALLERGIES:     Current Outpatient Medications on File Prior to Visit   Medication Sig    albuterol (PROVENTIL) 2.5 mg /3 mL (0.083 %) nebulizer solution Inhale 2.5 mg into the lungs.    aspirin (ECOTRIN) 81 MG EC tablet Take 81 mg by mouth once daily.    cetirizine (ZYRTEC) 10 MG tablet Take 1 tablet (10 mg total) by mouth once daily.    docusate sodium (COLACE) 100 MG capsule Take 100 mg by mouth once daily.    omeprazole (PRILOSEC) 20 MG capsule Take 1 capsule (20 mg total) by mouth once daily.    multivitamin with minerals tablet Take 1 tablet by mouth once daily. (Patient not taking: Reported on 7/22/2024)    tiZANidine (ZANAFLEX) 4 MG tablet Take 4 mg by mouth.     No current facility-administered medications on file prior to visit.       Review of patient's allergies indicates:   Allergen Reactions    Sulfa (sulfonamide antibiotics) Other (See Comments)     Mother was severely allergic and pt ws told not to take it . She has never had a reaction        OBJECTIVE:   Vital Signs:  Vitals:    12/23/24 0745   BP: 121/78   Pulse: 68   Resp: 20   Temp: 98.1 °F (36.7 °C)   TempSrc: Oral   SpO2: 97%   Weight: 103.3 kg (227 lb 12.8 oz)   Height: 5' 2" (1.575 m)         No results found for this or any previous visit (from the past 24 hours).        General: well-developed, well-nourished, no acute distress  Eye:  clear conjunctiva, eyelids normal  HENT: Head - normocephalic and atraumatic,  nasal  and oral mucosa moist and clear  Neck: full range of motion, no thyromegaly or lymphadenopathy, trachea midline, supple  Respiratory: clear to auscultation bilaterally without wheezes, rales, rhonchi  Cardiovascular: regular rate and rhythm without murmurs, " "gallops or rubs, normal S1 S2. No JVD. Capillary refill within normal limits.  Gastrointestinal: soft, non-tender, non-distended with normal bowel sounds in all four quadrants. No masses palpated  Musculoskeletal: full range of motion of all extremities without limitation or discomfort  Integumentary: no rashes or skin lesions present, no erythema  Neurologic: AAO x 3,  no dysarthria.  Psychiatric: cooperative with exam, good eye contact, no hallucinations.      Laboratory  Lab Results   Component Value Date    WBC 5.12 07/09/2024    HGB 13.8 07/09/2024    HCT 41.4 07/09/2024    MCV 93.9 07/09/2024     07/09/2024     @[id number](GLU,NA,K,Cl,CO2,BUN,Creatinine,Calcium,MG)@  No results found for: "INR", "PROTIME"  Lab Results   Component Value Date    HGBA1C 5.4 03/06/2018     No results for input(s): "POCTGLUCOSE" in the last 72 hours.      ASSESSMENT & PLAN:     History of right temporal basal cell carcinoma s/p excision 6/15  Actinic Keratosis s/p cryotherapy 1/2023  -Patient had prior appointment scheduled with Dermatology Clinic although she was told that she needs a repeat referral to be seen.    -Referral placed to [facility]ermat[facility] for follow up of basal cell carcinoma and actinic keratosis.  -Current and prior images uploaded to media.    Vitamin D toxicity - improving  -Patient has been taking vitamin-D 1000 daily, now stopped taking it    Osteopenia - resolved  -DEXA 07/21/2021:  Osteopenia of lumbar spine.  -Repeat DEXA scan 07/2024 normal.    GERD  -Continue omeprazole, patient states that reflux has been well controlled, she may take it as needed and eventually taper it off if with improving symptoms  -Patient still reports taking as needed, previous discussions in regards to possible repeat EGD in the future should her symptoms ever worsen    Allergic rhinitis  -Patient takes Claritin daily  -Allergies well controlled.         Health Maintenance   Pneumococcal vaccine:   TDAP: 6/2016  Influenza " vaccine:   Shingrix vaccine:   Cervical Cancer: pap 12/2022 negative, follows with [facility] gynecology.  MMG: last mammogram 12/2022, repeat due and ordered  Screening colonoscopy:  cologuard 6/2023 negative  Hepatitis Panel: completed  COVID-19: Declined        RTC in 6 months      Deborah Pittman MD  [employer] Internal Medicine PGY-III

## 2024-12-23 NOTE — PROGRESS NOTES
Attending physician addendum-  Patient discussed in clinic with the resident.   Care provided is appropriate.   Patient seen in clinic, discussed facial lesions that patient wants referral to derm clinic  Pictures in chart, appears low risk for malignancy

## 2025-01-03 ENCOUNTER — HOSPITAL ENCOUNTER (OUTPATIENT)
Dept: RADIOLOGY | Facility: HOSPITAL | Age: 70
Discharge: HOME | End: 2025-01-03
Payer: MEDICARE

## 2025-01-03 DIAGNOSIS — Z12.31 SCREENING MAMMOGRAM FOR BREAST CANCER: ICD-10-CM

## 2025-01-03 PROCEDURE — 77063 BREAST TOMOSYNTHESIS BI: CPT | Mod: 26,,, | Performed by: RADIOLOGY

## 2025-01-03 PROCEDURE — 77067 SCR MAMMO BI INCL CAD: CPT | Mod: 26,,, | Performed by: RADIOLOGY

## 2025-01-03 PROCEDURE — 77067 SCR MAMMO BI INCL CAD: CPT | Mod: TC

## 2025-03-12 ENCOUNTER — CLINICAL SUPPORT (OUTPATIENT)
Dept: OPHTHALMOLOGY | Facility: CLINIC | Age: 70
End: 2025-03-12
Payer: MEDICARE

## 2025-03-12 VITALS — WEIGHT: 227.75 LBS | HEIGHT: 62 IN | BODY MASS INDEX: 41.91 KG/M2

## 2025-03-12 DIAGNOSIS — H26.491 PCO (POSTERIOR CAPSULAR OPACIFICATION), RIGHT: Primary | ICD-10-CM

## 2025-03-12 PROCEDURE — 99213 OFFICE O/P EST LOW 20 MIN: CPT | Mod: PBBFAC,PN

## 2025-03-12 PROCEDURE — 66821 AFTER CATARACT LASER SURGERY: CPT | Mod: PBBFAC,PN

## 2025-03-12 RX ORDER — PREDNISOLONE ACETATE 10 MG/ML
1 SUSPENSION/ DROPS OPHTHALMIC 4 TIMES DAILY
Qty: 5 ML | Refills: 0 | Status: SHIPPED | OUTPATIENT
Start: 2025-03-12 | End: 2026-03-12

## 2025-03-12 RX ORDER — PHENYLEPH/TROPICAMIDE IN WATER 2.5 %-1 %
1 DROPS OPHTHALMIC (EYE) ONCE
Status: COMPLETED | OUTPATIENT
Start: 2025-03-12 | End: 2025-03-12

## 2025-03-12 RX ADMIN — Medication 1 DROP: at 08:03

## 2025-03-12 NOTE — PROGRESS NOTES
HPI    RTC next avail procedure day YAG OD (dilate OD only)                        Last edited by Latrice Puente MA on 3/12/2025  8:07 AM.            Assessment /Plan     For exam results, see Encounter Report.    PCO (posterior capsular opacification), right  -     tropicamide /PHENYLephrine opthalmic solution 1 drop      OCT MAC   07/22/2024  OD: , intact foveal contour, no IRF/SRF  OS: , intact foveal contour, no IRF/SRF      PSK OU  - DOS OS 11/03/2022  - DOS OD 11/9/21  - BCVA 20/30//2025 - will hold off on Mrx until after YAG OD     2. PCO, right eye  - BCVA 20/30 - bothered by vision. Interested in YAG OD  - s/p yag 3/12/25, completed without complication  - RTC 1 week for Mrx, DFE OD     RTC 1 week Mrx, DFE OD      PROCEDURE NOTE: YAG capsulotomy of right eye  - Indication: Visually significant posterior capsule opacification  - Risks, benefits, and alternatives discussed.  Patient voiced understanding, all questions were answered, and pt wished to proceed.  Consent was obtained  - The above indicated pupil was dilated using phenylephrine and tropicamide.   - Iopidine instilled 15-30 minutes prior to procedure  - A time out was performed including patient name, date of birth, and site for procedure  - An Venkatesh lens with goniosol was utilized for procedure.  Laser shots (as below) were used to create a circular pattern to release the posterior capsule with good result   - Power: 1.4-2.2 mJ   - Shots: 80   - Total power: 138 mJ  - After the procedure, the eye was rinsed with eye wash.  Pt tolerated tolerated the procedure well.  - Intraocular pressure checked 30 minutes after procedure, was 12 mmHg  - Patient was given a prescription for PredForte (prednisolone acetate 0.1%) to use in the affected eye QID x 7 days  - Return precautions discussed

## 2025-03-13 ENCOUNTER — TELEPHONE (OUTPATIENT)
Dept: OPHTHALMOLOGY | Facility: CLINIC | Age: 70
End: 2025-03-13
Payer: MEDICARE

## 2025-03-19 ENCOUNTER — OFFICE VISIT (OUTPATIENT)
Dept: OPHTHALMOLOGY | Facility: CLINIC | Age: 70
End: 2025-03-19
Payer: MEDICARE

## 2025-03-19 ENCOUNTER — OFFICE VISIT (OUTPATIENT)
Dept: FAMILY MEDICINE | Facility: CLINIC | Age: 70
End: 2025-03-19
Payer: MEDICARE

## 2025-03-19 VITALS
BODY MASS INDEX: 41.22 KG/M2 | TEMPERATURE: 99 F | OXYGEN SATURATION: 99 % | WEIGHT: 224 LBS | HEART RATE: 84 BPM | SYSTOLIC BLOOD PRESSURE: 144 MMHG | DIASTOLIC BLOOD PRESSURE: 79 MMHG | HEIGHT: 62 IN | RESPIRATION RATE: 18 BRPM

## 2025-03-19 VITALS — BODY MASS INDEX: 41.22 KG/M2 | HEIGHT: 62 IN | WEIGHT: 224 LBS

## 2025-03-19 DIAGNOSIS — Z85.828 HISTORY OF BASAL CELL CANCER: ICD-10-CM

## 2025-03-19 DIAGNOSIS — L57.0 ACTINIC KERATOSIS: Primary | ICD-10-CM

## 2025-03-19 DIAGNOSIS — Z98.890 POSTOPERATIVE EYE STATE: Primary | ICD-10-CM

## 2025-03-19 PROCEDURE — 17003 DESTRUCT PREMALG LES 2-14: CPT | Mod: PBBFAC | Performed by: FAMILY MEDICINE

## 2025-03-19 PROCEDURE — 17000 DESTRUCT PREMALG LESION: CPT | Mod: PBBFAC | Performed by: FAMILY MEDICINE

## 2025-03-19 PROCEDURE — 99213 OFFICE O/P EST LOW 20 MIN: CPT | Mod: PBBFAC,27,PN

## 2025-03-19 PROCEDURE — 99213 OFFICE O/P EST LOW 20 MIN: CPT | Mod: PBBFAC

## 2025-03-19 NOTE — PROGRESS NOTES
HPI    RTC 1 Week Mrx, DFE OD  Last edited by Betty Fry on 3/19/2025  1:17 PM.            Assessment /Plan     For exam results, see Encounter Report.    There are no diagnoses linked to this encounter.    OCT MAC   07/22/2024  OD: , intact foveal contour, no IRF/SRF  OS: , intact foveal contour, no IRF/SRF      PSK OU  - DOS OS 11/03/2022  - DOS OD 11/9/21  - BCVA 20/30//20/25 - will hold off on Mrx until after YAG OD     2. PCO, right eye s/p YAG  - BCVA 20/30 - bothered by vision. Interested in YAG OD  - s/p yag 3/12/25, completed without complication  - 3/19/25: Doing well, no inflammation. Can stop PF. No retinal/tears/detachments noted. Mrx given for [facility] 20/20 OU.     RTC 6 months DFE (has not had a DFE OS in several years)

## 2025-03-19 NOTE — PROGRESS NOTES
"[facility] MINOR PROCEDURE OFFICE VISIT NOTE  MRN: [id number]  Date: 03/19/2025  PCP: Thee Maldonado MD      Chief Complaint: skin lesions on face and hand    Subjective:    HPI  Meseret Tello is a 69 y.o. female, with known history of basal cell carcinoma x2 which has been excised years ago, presenting to [facility] minor surgery clinic for small skin lesion on left forehead that has been present for about 1 year now but has become slightly more prominent; has not been treated yet. Area gets itchy and bleeds if patient accidentally scratches at lesion. Dry skin on left hand forearm present for at least 3 months per patient and has not been treated yet with cryotherapy nor medications.       ROS per HPI above.      Outpatient Medications as of 3/19/2025   Medication Sig Dispense Refill    albuterol (PROVENTIL) 2.5 mg /3 mL (0.083 %) nebulizer solution Inhale 2.5 mg into the lungs.      aspirin (ECOTRIN) 81 MG EC tablet Take 81 mg by mouth once daily.      cetirizine (ZYRTEC) 10 MG tablet Take 1 tablet (10 mg total) by mouth once daily. 90 tablet 3    docusate sodium (COLACE) 100 MG capsule Take 100 mg by mouth once daily.      omeprazole (PRILOSEC) 20 MG capsule Take 1 capsule (20 mg total) by mouth once daily. 90 capsule 3    prednisoLONE acetate (PRED FORTE) 1 % DrpS Place 1 drop into the right eye 4 (four) times daily. 5 mL 0     No current facility-administered medications on file as of 3/19/2025.        Objective:  Vitals:    03/19/25 1020   BP: (!) 144/79   Patient Position: Sitting   Pulse: 84   Resp: 18   Temp: 98.6 °F (37 °C)   SpO2: 99%   Weight: 101.6 kg (224 lb)   Height: 5' 1.81" (1.57 m)       Physical Exam  Constitutional:       General: She is not in acute distress.     Appearance: She is not ill-appearing.   HENT:      Head:        Comments: Pinpoint sized skin protrusion that is flesh colored but firm/hard in texture located at left forehead above eyebrow.  Pulmonary:      Effort: Pulmonary effort is " normal. No respiratory distress.   Skin:     Comments: 2 areas of hyperpigmented, flaking dry skin about 2-3 mm in size located on dorsal side of radial left hand at the 5th MCP and at the posterior lateral side of the left forearm.       Assessment/ Plan:    Actinic keratosis  History of basal cell cancer  - given past medical history of basal cell carcinoma, decision made to do cryotherapy on skin lesion on forehead due to concern for precancerous origin. After discussing risks and benefits of cryotherapy vs observation vs excision, patient agreeable and made informed decision to try cryotherapy with close follow up.  - cryotherapy performed x3 today in clinic (see procedure note as below); patient tolerated procedure well and post procedure protocol discussed with patient who voiced her understanding at this time.         Procedure Note:  Procedure: cryotherapy for AK x 3  Attending physician: Lory Hamm MD  Resident physician: Vivienne Craig DO  Consent: Signed consent obtained after discussion of risks, benefits, and alternative treatments. Time out completed  Description: Liquid nitrogen used for cryotherapy. Tip held 1 cm from lesion and sprayed in freeze-thaw cycle for 2 lesions on the left upper extremity and 1 lesion on the left forehead.  The patient tolerated the procedure well with no complications.       Follow up in about 6 weeks (around 4/30/2025).           Vivienne Craig DO  [employer]  Resident, -3

## 2025-04-30 ENCOUNTER — OFFICE VISIT (OUTPATIENT)
Dept: FAMILY MEDICINE | Facility: CLINIC | Age: 70
End: 2025-04-30
Payer: MEDICARE

## 2025-04-30 VITALS
BODY MASS INDEX: 41.22 KG/M2 | DIASTOLIC BLOOD PRESSURE: 81 MMHG | HEIGHT: 62 IN | SYSTOLIC BLOOD PRESSURE: 149 MMHG | RESPIRATION RATE: 18 BRPM | OXYGEN SATURATION: 99 % | HEART RATE: 88 BPM | TEMPERATURE: 98 F

## 2025-04-30 DIAGNOSIS — L57.0 ACTINIC KERATOSIS: Primary | ICD-10-CM

## 2025-04-30 PROCEDURE — 17003 DESTRUCT PREMALG LES 2-14: CPT | Mod: PBBFAC | Performed by: FAMILY MEDICINE

## 2025-04-30 PROCEDURE — 17000 DESTRUCT PREMALG LESION: CPT | Mod: PBBFAC | Performed by: FAMILY MEDICINE

## 2025-04-30 PROCEDURE — 99213 OFFICE O/P EST LOW 20 MIN: CPT | Mod: PBBFAC

## 2025-04-30 NOTE — PROGRESS NOTES
Subjective:       Patient ID: Meseret Tello is a 69 y.o. female.    Chief Complaint: AK follow up    HPI  68 yo returns to minor surgery for follow-up after cryotherapy for actinic keratosis at last visit.  She has a history of BCC x 2.  Patient tolerated cryotherapy well without any problems.  Reports still small lesions on her hand.  The lesion above her eyebrow is resolved.    Review of Systems  As per HPI         Objective:      Vitals:    04/30/25 1037   BP: (!) 149/81   Pulse: 88   Resp: 18   Temp: 98.2 °F (36.8 °C)       Physical Exam    Gen: alert, no acute distress  Skin:  Rough, scaly lesions on an erythematous base consistent with actinic keratoses on her left forearm and hand.  Area above eyebrow now clear.  Psych: cooperative, appropriate mood and affect    Procedure Note:  Procedure:  Cryotherapy for actinic keratoses x 4  Performed by: Lory Hamm MD  Consent: signed consent obtained after discussion of risks, benefits, and alternative treatments. Time out completed  Description: Liquid nitrogen used for cryotherapy. Tip held 1 cm from lesion and sprayed in freeze-thaw cycle.   The patient tolerated the procedure well with no complications.     Assessment/Plan:  Actinic keratosis    - cryo today  - Post care instructions and return precautions discussed.        Follow up in about 6 weeks (around 6/11/2025) for AK.

## 2025-06-09 ENCOUNTER — OFFICE VISIT (OUTPATIENT)
Dept: INTERNAL MEDICINE | Facility: CLINIC | Age: 70
End: 2025-06-09
Payer: MEDICARE

## 2025-06-09 ENCOUNTER — LAB VISIT (OUTPATIENT)
Dept: LAB | Facility: HOSPITAL | Age: 70
End: 2025-06-09
Payer: MEDICARE

## 2025-06-09 VITALS
OXYGEN SATURATION: 98 % | HEIGHT: 63 IN | WEIGHT: 226 LBS | SYSTOLIC BLOOD PRESSURE: 127 MMHG | TEMPERATURE: 98 F | BODY MASS INDEX: 40.04 KG/M2 | DIASTOLIC BLOOD PRESSURE: 76 MMHG | HEART RATE: 62 BPM | RESPIRATION RATE: 16 BRPM

## 2025-06-09 DIAGNOSIS — Z00.00 ROUTINE ADULT HEALTH MAINTENANCE: ICD-10-CM

## 2025-06-09 DIAGNOSIS — M85.80 OSTEOPENIA, UNSPECIFIED LOCATION: ICD-10-CM

## 2025-06-09 DIAGNOSIS — L57.0 ACTINIC KERATOSIS: Primary | ICD-10-CM

## 2025-06-09 DIAGNOSIS — Z85.828 HISTORY OF BASAL CELL CARCINOMA (BCC) OF SKIN: ICD-10-CM

## 2025-06-09 PROBLEM — H25.813 COMBINED FORMS OF AGE-RELATED CATARACT OF BOTH EYES: Status: ACTIVE | Noted: 2022-10-26

## 2025-06-09 LAB
25(OH)D3+25(OH)D2 SERPL-MCNC: 49 NG/ML (ref 30–80)
ALBUMIN SERPL-MCNC: 3.5 G/DL (ref 3.4–4.8)
ALBUMIN/GLOB SERPL: 1 RATIO (ref 1.1–2)
ALP SERPL-CCNC: 63 UNIT/L (ref 40–150)
ALT SERPL-CCNC: 13 UNIT/L (ref 0–55)
ANION GAP SERPL CALC-SCNC: 5 MEQ/L
AST SERPL-CCNC: 14 UNIT/L (ref 11–45)
BASOPHILS # BLD AUTO: 0.03 X10(3)/MCL
BASOPHILS NFR BLD AUTO: 0.6 %
BILIRUB SERPL-MCNC: 0.3 MG/DL
BUN SERPL-MCNC: 15.2 MG/DL (ref 9.8–20.1)
CALCIUM SERPL-MCNC: 9.6 MG/DL (ref 8.4–10.2)
CHLORIDE SERPL-SCNC: 111 MMOL/L (ref 98–107)
CHOLEST SERPL-MCNC: 205 MG/DL
CHOLEST/HDLC SERPL: 4 {RATIO} (ref 0–5)
CO2 SERPL-SCNC: 25 MMOL/L (ref 23–31)
CREAT SERPL-MCNC: 0.72 MG/DL (ref 0.55–1.02)
CREAT/UREA NIT SERPL: 21
EOSINOPHIL # BLD AUTO: 0.16 X10(3)/MCL (ref 0–0.9)
EOSINOPHIL NFR BLD AUTO: 3.2 %
ERYTHROCYTE [DISTWIDTH] IN BLOOD BY AUTOMATED COUNT: 13.3 % (ref 11.5–17)
EST. AVERAGE GLUCOSE BLD GHB EST-MCNC: 102.5 MG/DL
GFR SERPLBLD CREATININE-BSD FMLA CKD-EPI: >60 ML/MIN/1.73/M2
GLOBULIN SER-MCNC: 3.6 GM/DL (ref 2.4–3.5)
GLUCOSE SERPL-MCNC: 89 MG/DL (ref 82–115)
HBA1C MFR BLD: 5.2 %
HCT VFR BLD AUTO: 40.7 % (ref 37–47)
HDLC SERPL-MCNC: 46 MG/DL (ref 35–60)
HGB BLD-MCNC: 13.6 G/DL (ref 12–16)
IMM GRANULOCYTES # BLD AUTO: 0.01 X10(3)/MCL (ref 0–0.04)
IMM GRANULOCYTES NFR BLD AUTO: 0.2 %
LDLC SERPL CALC-MCNC: 133 MG/DL (ref 50–140)
LYMPHOCYTES # BLD AUTO: 1.56 X10(3)/MCL (ref 0.6–4.6)
LYMPHOCYTES NFR BLD AUTO: 31.5 %
MCH RBC QN AUTO: 31.9 PG (ref 27–31)
MCHC RBC AUTO-ENTMCNC: 33.4 G/DL (ref 33–36)
MCV RBC AUTO: 95.3 FL (ref 80–94)
MONOCYTES # BLD AUTO: 0.33 X10(3)/MCL (ref 0.1–1.3)
MONOCYTES NFR BLD AUTO: 6.7 %
NEUTROPHILS # BLD AUTO: 2.86 X10(3)/MCL (ref 2.1–9.2)
NEUTROPHILS NFR BLD AUTO: 57.8 %
NRBC BLD AUTO-RTO: 0 %
PLATELET # BLD AUTO: 187 X10(3)/MCL (ref 130–400)
PMV BLD AUTO: 9.7 FL (ref 7.4–10.4)
POTASSIUM SERPL-SCNC: 4.2 MMOL/L (ref 3.5–5.1)
PROT SERPL-MCNC: 7.1 GM/DL (ref 5.8–7.6)
RBC # BLD AUTO: 4.27 X10(6)/MCL (ref 4.2–5.4)
SODIUM SERPL-SCNC: 141 MMOL/L (ref 136–145)
TRIGL SERPL-MCNC: 130 MG/DL (ref 37–140)
VLDLC SERPL CALC-MCNC: 26 MG/DL
WBC # BLD AUTO: 4.95 X10(3)/MCL (ref 4.5–11.5)

## 2025-06-09 PROCEDURE — 36415 COLL VENOUS BLD VENIPUNCTURE: CPT

## 2025-06-09 PROCEDURE — 85025 COMPLETE CBC W/AUTO DIFF WBC: CPT

## 2025-06-09 PROCEDURE — 82306 VITAMIN D 25 HYDROXY: CPT

## 2025-06-09 PROCEDURE — 80053 COMPREHEN METABOLIC PANEL: CPT

## 2025-06-09 PROCEDURE — 80061 LIPID PANEL: CPT

## 2025-06-09 PROCEDURE — 83036 HEMOGLOBIN GLYCOSYLATED A1C: CPT

## 2025-06-09 PROCEDURE — 99213 OFFICE O/P EST LOW 20 MIN: CPT | Mod: PBBFAC

## 2025-06-09 NOTE — PROGRESS NOTES
[facility]  CLINIC NOTE    Patient Name: Meseret Tello  YOB: 1955  Chief Complaint:  Chief Complaint   Patient presents with    Hip Pain     Left hip with pain, home remedies help       PRESENTING HISTORY       History of Present Illness:  Patient is a 70 y.o. female with a pmhx of  GERD, basal cell carcinoma (s/p excision 6/15), actinic keratosis s/p cryotherapy 1/24/23, cataract surgery presents to clinic for routine follow up.     Patient complaining of some mild pain in her left hip, she has been experiencing this pain intermittently for multiple years.  She reports taking Tylenol intermittently, also uses icy hot and works in an exercise program.  Pain does not affect her daily living and reports that it is well-controlled.  She regularly follows with minor procedures clinic in regards to her actinic keratosis.  Overall, doing well.    Smoked for 3 years when she was 17 yo, 1 pack a week. Drinks 1 glass of whiskey once a year. No illicit drugs. FH of colorectal cancer father.        Constitutional: no fever/chills  EENT: no sore throat, ear pain, sinus pain/congestion, nasal congestion/drainage  Respiratory: no cough, no wheezing, no shortness of breath  Cardiovascular: no chest pain, no palpitations, no edema  Gastrointestinal: no nausea, vomiting, or diarrhea. No abdominal pain  Genitourinary: no dysuria, no urinary frequency or urgency, no hematuria  Integumentary: no skin rash or abnormal lesion  Neurologic: no headache, no dizziness, no weakness or numbness    PAST HISTORY:     Past Medical History:   Diagnosis Date    Abnormal Pap smear of cervix     Allergy     Cataract     GERD (gastroesophageal reflux disease)     Hyperlipidemia        Past Surgical History:   Procedure Laterality Date    APPENDECTOMY  Dont know year    CATARACT EXTRACTION Right     CATARACT EXTRACTION W/  INTRAOCULAR LENS IMPLANT Left 11/03/2022    Procedure: EXTRACTION, CATARACT, WITH IOL  INSERTION;  Surgeon: Armin Blandon MD;  Location: [facility];  Service: Ophthalmology;  Laterality: Left;    EYE SURGERY Right 11/09/2021    RIGHT PHACO/IOL    SKIN CANCER EXCISION         Family History   Problem Relation Name Age of Onset    Breast cancer Maternal Grandmother      Colon cancer Father Father     Cancer Father Father     Heart failure Mother      No Known Problems Brother      No Known Problems Sister         Social History     Socioeconomic History    Marital status:      Spouse name: MIGUEL    Number of children: 2    Years of education: 12   Tobacco Use    Smoking status: Former     Current packs/day: 0.25     Average packs/day: 0.3 packs/day for 3.0 years (0.8 ttl pk-yrs)     Types: Cigarettes    Smokeless tobacco: Never    Tobacco comments:     Smoked 2 years as teenager   Substance and Sexual Activity    Alcohol use: Not Currently     Alcohol/week: 1.0 standard drink of alcohol     Types: 1 Drinks containing 0.5 oz of alcohol per week     Comment: Once a year    Drug use: Never    Sexual activity: Not Currently     Birth control/protection: None     Social Drivers of Health     Financial Resource Strain: Patient Declined (3/12/2025)    Overall Financial Resource Strain (CARDIA)     Difficulty of Paying Living Expenses: Patient declined   Food Insecurity: Patient Declined (3/12/2025)    Hunger Vital Sign     Worried About Running Out of Food in the Last Year: Patient declined     Ran Out of Food in the Last Year: Patient declined   Transportation Needs: Patient Declined (3/12/2025)    PRAPARE - Transportation     Lack of Transportation (Medical): Patient declined     Lack of Transportation (Non-Medical): Patient declined   Physical Activity: Insufficiently Active (3/12/2025)    Exercise Vital Sign     Days of Exercise per Week: 2 days     Minutes of Exercise per Session: 20 min   Stress: No Stress Concern Present (3/12/2025)    [employer] - Occupational  "Stress Questionnaire     Feeling of Stress : Only a little   Housing Stability: Patient Declined (3/12/2025)    Housing Stability Vital Sign     Unable to Pay for Housing in the Last Year: Patient declined     Number of Times Moved in the Last Year: 0     Homeless in the Last Year: Patient declined       MEDICATIONS & ALLERGIES:     Current Outpatient Medications on File Prior to Visit   Medication Sig    aspirin (ECOTRIN) 81 MG EC tablet Take 81 mg by mouth once daily.    cetirizine (ZYRTEC) 10 MG tablet Take 1 tablet (10 mg total) by mouth once daily.    docusate sodium (COLACE) 100 MG capsule Take 100 mg by mouth once daily.    omeprazole (PRILOSEC) 20 MG capsule Take 1 capsule (20 mg total) by mouth once daily.    albuterol (PROVENTIL) 2.5 mg /3 mL (0.083 %) nebulizer solution Inhale 2.5 mg into the lungs. (Patient not taking: Reported on 6/9/2025)     No current facility-administered medications on file prior to visit.       Review of patient's allergies indicates:   Allergen Reactions    Sulfa (sulfonamide antibiotics) Other (See Comments)     Mother was severely allergic and pt ws told not to take it . She has never had a reaction        OBJECTIVE:   Vital Signs:  Vitals:    06/09/25 0806   BP: 127/76   Pulse: 62   Resp: 16   Temp: 98.1 °F (36.7 °C)   SpO2: 98%   Weight: 102.5 kg (226 lb)   Height: 5' 3" (1.6 m)           No results found for this or any previous visit (from the past 24 hours).        General: well-developed, well-nourished, no acute distress  Eye:  clear conjunctiva, eyelids normal  HENT: Head - normocephalic and atraumatic,  nasal  and oral mucosa moist and clear  Neck: full range of motion, no thyromegaly or lymphadenopathy, trachea midline, supple  Respiratory: clear to auscultation bilaterally without wheezes, rales, rhonchi  Cardiovascular: regular rate and rhythm without murmurs, gallops or rubs, normal S1 S2. No JVD. Capillary refill within normal limits.  Gastrointestinal: soft, " "non-tender, non-distended with normal bowel sounds in all four quadrants. No masses palpated  Musculoskeletal: full range of motion of all extremities without limitation or discomfort  Integumentary: no rashes or skin lesions present, no erythema  Neurologic: AAO x 3,  no dysarthria.  Psychiatric: cooperative with exam, good eye contact, no hallucinations.      Laboratory  Lab Results   Component Value Date    WBC 5.12 07/09/2024    HGB 13.8 07/09/2024    HCT 41.4 07/09/2024    MCV 93.9 07/09/2024     07/09/2024     @[id number](GLU,NA,K,Cl,CO2,BUN,Creatinine,Calcium,MG)@  No results found for: "INR", "PROTIME"  Lab Results   Component Value Date    HGBA1C 5.4 03/06/2018     No results for input(s): "POCTGLUCOSE" in the last 72 hours.      ASSESSMENT & PLAN:     History of right temporal basal cell carcinoma s/p excision 6/15  Actinic Keratosis s/p cryotherapy 1/2023  -Patient had prior appointment scheduled with Dermatology Clinic although she was told that she needs a repeat referral to be seen.    -Referral placed to [facility] for follow up of basal cell carcinoma and actinic keratosis.  -Received cryotherapy for AK x 4.  Following with minor procedures clinic.    Vitamin D toxicity - improving  -Patient has been taking vitamin-D 1000 daily, now stopped taking it  -repeat ordered.    Osteopenia - resolved  -DEXA 07/21/2021:  Osteopenia of lumbar spine.  -Repeat DEXA scan 07/2024 normal.    GERD  -Continue omeprazole, patient states that reflux has been well controlled, she may take it as needed and eventually taper it off if with improving symptoms  -Patient still reports taking as needed, previous discussions in regards to possible repeat EGD in the future should her symptoms ever worsen  -controlled with diet    Allergic rhinitis  -Patient takes Claritin daily.   -Allergies well controlled.         Health Maintenance   Pneumococcal vaccine:   TDAP: 6/2016  Influenza vaccine:   Shingrix vaccine: "   Cervical Cancer: pap 12/2022 negative, follows with [facility] gynecology.  MMG: last mammogram 01/03/2025.  Screening colonoscopy:  cologuard 6/2023 negative  Hepatitis Panel: completed  COVID-19: Declined        RTC in 6 months      Thee Maldonado MD  [employer] Internal Medicine PGY-II

## 2025-06-09 NOTE — PROGRESS NOTES
I saw and evaluated the patient and was present for the key portions of the exam and separately billed procedures, if any.  I have reviewed and agree with the resident's findings, including all diagnostic interpretations and plans as written.    Patient is here for follow up. Pt with complaints of chronic L hip pain that is controlled with current outpt regimen of topical icy hot and exercise. ASCVD risk of 8.9% with lipid panel in 2023. Repeating labs today. Recommend discussing moderate intensity statin with the patient. Remainder of care as documented per resident.     Patt Palma MD

## 2025-06-11 ENCOUNTER — OFFICE VISIT (OUTPATIENT)
Dept: FAMILY MEDICINE | Facility: CLINIC | Age: 70
End: 2025-06-11
Payer: MEDICARE

## 2025-06-11 VITALS
WEIGHT: 226 LBS | SYSTOLIC BLOOD PRESSURE: 133 MMHG | BODY MASS INDEX: 40.04 KG/M2 | DIASTOLIC BLOOD PRESSURE: 77 MMHG | TEMPERATURE: 99 F | RESPIRATION RATE: 18 BRPM | HEART RATE: 60 BPM | OXYGEN SATURATION: 100 % | HEIGHT: 63 IN

## 2025-06-11 DIAGNOSIS — L57.0 ACTINIC KERATOSIS: Primary | ICD-10-CM

## 2025-06-11 PROCEDURE — 99213 OFFICE O/P EST LOW 20 MIN: CPT | Mod: PBBFAC

## 2025-06-11 PROCEDURE — 17000 DESTRUCT PREMALG LESION: CPT | Mod: PBBFAC | Performed by: FAMILY MEDICINE

## 2025-06-11 PROCEDURE — 17003 DESTRUCT PREMALG LES 2-14: CPT | Mod: PBBFAC | Performed by: FAMILY MEDICINE

## 2025-06-11 NOTE — PROGRESS NOTES
Subjective:       Patient ID: Meseret Tello is a 70 y.o. female.    Chief Complaint: Follow-up (Actinic keratosis follow up No new complaints/)    HPI  69 yo with h/o BCC x 2 and AK returns to minor surgery for follow-up.  Patient had cryotherapy at the previous visit and is doing well.  Patient denies any new concerns today.    Review of Systems  As per HPI         Objective:      Vitals:    06/11/25 0809   BP: 133/77   Pulse:    Resp:    Temp:        Physical Exam    Gen: alert, no acute distress  Skin: rough, scaly lesion x 2 to right and left forearm. Diffuse photodamage  Psych: cooperative, appropriate mood and affect    Procedure Note:  Procedure: cryotherapy for AK x 2  Performed by: Lory Hamm MD  Consent: signed consent obtained after discussion of risks, benefits, and alternative treatments. Time out completed  Description: Liquid nitrogen used for cryotherapy. Tip held 1 cm from lesion and sprayed in freeze-thaw cycle.   The patient tolerated the procedure well with no complications.         Assessment/Plan:  Actinic keratosis    - cryo today for AK x 2  - Post care instructions and return precautions discussed.   - sun protection discussed     Follow up in about 6 months (around 12/11/2025).

## 2025-06-19 DIAGNOSIS — J30.2 SEASONAL ALLERGIES: ICD-10-CM

## 2025-06-19 DIAGNOSIS — K21.9 GASTROESOPHAGEAL REFLUX DISEASE WITHOUT ESOPHAGITIS: ICD-10-CM

## 2025-06-20 RX ORDER — CETIRIZINE HYDROCHLORIDE 10 MG/1
10 TABLET ORAL DAILY
Qty: 90 TABLET | Refills: 3 | Status: SHIPPED | OUTPATIENT
Start: 2025-06-20

## 2025-06-20 RX ORDER — OMEPRAZOLE 20 MG/1
20 CAPSULE, DELAYED RELEASE ORAL DAILY
Qty: 90 CAPSULE | Refills: 3 | Status: SHIPPED | OUTPATIENT
Start: 2025-06-20

## (undated) DEVICE — GLOVE PROTEXIS BLUE LATEX 7

## (undated) DEVICE — Device

## (undated) DEVICE — PACK CATARACT BAUSCH AND LOMB

## (undated) DEVICE — 1.1MM KNIFE

## (undated) DEVICE — BETADINE OPTHALMIC SOL 5% 30ML

## (undated) DEVICE — DRESSING POLYSKIN II 2X2.75IN

## (undated) DEVICE — GLOVE PROTEXIS BLUE LATEX 7.5

## (undated) DEVICE — POSITIONER IV ARMBOARD FOAM

## (undated) DEVICE — GLOVE PROTEXIS LTX MICRO  7.5

## (undated) DEVICE — GLOVE PROTEXIS LTX MICRO 8

## (undated) DEVICE — GOWN POLY REINF BRTH SLV XL

## (undated) DEVICE — EYE DRAPE

## (undated) DEVICE — NDL FLTR 5MCRN BLNT TIP 18GX1

## (undated) DEVICE — CONTAINER SPECIMEN 4.5OZ

## (undated) DEVICE — 2.4MM SLEEVE